# Patient Record
Sex: MALE | Race: WHITE | NOT HISPANIC OR LATINO | URBAN - METROPOLITAN AREA
[De-identification: names, ages, dates, MRNs, and addresses within clinical notes are randomized per-mention and may not be internally consistent; named-entity substitution may affect disease eponyms.]

---

## 2018-03-15 ENCOUNTER — INPATIENT (INPATIENT)
Facility: HOSPITAL | Age: 73
LOS: 0 days | Discharge: HOME | DRG: 866 | End: 2018-03-16
Attending: INTERNAL MEDICINE | Admitting: INTERNAL MEDICINE
Payer: MEDICARE

## 2018-03-15 VITALS
OXYGEN SATURATION: 97 % | SYSTOLIC BLOOD PRESSURE: 117 MMHG | HEART RATE: 84 BPM | RESPIRATION RATE: 18 BRPM | DIASTOLIC BLOOD PRESSURE: 76 MMHG | WEIGHT: 164.91 LBS | TEMPERATURE: 98 F

## 2018-03-15 DIAGNOSIS — Z29.9 ENCOUNTER FOR PROPHYLACTIC MEASURES, UNSPECIFIED: ICD-10-CM

## 2018-03-15 DIAGNOSIS — N17.9 ACUTE KIDNEY FAILURE, UNSPECIFIED: ICD-10-CM

## 2018-03-15 DIAGNOSIS — J11.1 INFLUENZA DUE TO UNIDENTIFIED INFLUENZA VIRUS WITH OTHER RESPIRATORY MANIFESTATIONS: ICD-10-CM

## 2018-03-15 DIAGNOSIS — I10 ESSENTIAL (PRIMARY) HYPERTENSION: ICD-10-CM

## 2018-03-15 DIAGNOSIS — I48.91 UNSPECIFIED ATRIAL FIBRILLATION: ICD-10-CM

## 2018-03-15 DIAGNOSIS — C61 MALIGNANT NEOPLASM OF PROSTATE: ICD-10-CM

## 2018-03-15 DIAGNOSIS — R63.8 OTHER SYMPTOMS AND SIGNS CONCERNING FOOD AND FLUID INTAKE: ICD-10-CM

## 2018-03-15 LAB
ALBUMIN SERPL ELPH-MCNC: 4.9 G/DL — SIGNIFICANT CHANGE UP (ref 3.3–5)
ALP SERPL-CCNC: 65 U/L — SIGNIFICANT CHANGE UP (ref 40–120)
ALT FLD-CCNC: 12 U/L — SIGNIFICANT CHANGE UP (ref 10–45)
ANION GAP SERPL CALC-SCNC: 18 MMOL/L — HIGH (ref 5–17)
APTT BLD: 29.6 SEC — SIGNIFICANT CHANGE UP (ref 27.5–37.4)
AST SERPL-CCNC: 19 U/L — SIGNIFICANT CHANGE UP (ref 10–40)
BASOPHILS NFR BLD AUTO: 0 % — SIGNIFICANT CHANGE UP (ref 0–2)
BILIRUB SERPL-MCNC: 0.4 MG/DL — SIGNIFICANT CHANGE UP (ref 0.2–1.2)
BUN SERPL-MCNC: 23 MG/DL — SIGNIFICANT CHANGE UP (ref 7–23)
CALCIUM SERPL-MCNC: 10.2 MG/DL — SIGNIFICANT CHANGE UP (ref 8.4–10.5)
CHLORIDE SERPL-SCNC: 95 MMOL/L — LOW (ref 96–108)
CK MB CFR SERPL CALC: 1.1 NG/ML — SIGNIFICANT CHANGE UP (ref 0–6.7)
CK SERPL-CCNC: 67 U/L — SIGNIFICANT CHANGE UP (ref 30–200)
CO2 SERPL-SCNC: 23 MMOL/L — SIGNIFICANT CHANGE UP (ref 22–31)
CREAT SERPL-MCNC: 1.61 MG/DL — HIGH (ref 0.5–1.3)
EOSINOPHIL NFR BLD AUTO: 0 % — SIGNIFICANT CHANGE UP (ref 0–6)
EXTRA SST TUBE: SIGNIFICANT CHANGE UP
GLUCOSE SERPL-MCNC: 121 MG/DL — HIGH (ref 70–99)
HCT VFR BLD CALC: 46.6 % — SIGNIFICANT CHANGE UP (ref 39–50)
HGB BLD-MCNC: 15.6 G/DL — SIGNIFICANT CHANGE UP (ref 13–17)
INR BLD: 1.11 — SIGNIFICANT CHANGE UP (ref 0.88–1.16)
LG PLATELETS BLD QL AUTO: PRESENT — SIGNIFICANT CHANGE UP
LYMPHOCYTES # BLD AUTO: 9 % — LOW (ref 13–44)
MANUAL DIF COMMENT BLD-IMP: SIGNIFICANT CHANGE UP
MANUAL SMEAR VERIFICATION: SIGNIFICANT CHANGE UP
MCHC RBC-ENTMCNC: 31.8 PG — SIGNIFICANT CHANGE UP (ref 27–34)
MCHC RBC-ENTMCNC: 33.5 G/DL — SIGNIFICANT CHANGE UP (ref 32–36)
MCV RBC AUTO: 94.9 FL — SIGNIFICANT CHANGE UP (ref 80–100)
MONOCYTES NFR BLD AUTO: 6 % — SIGNIFICANT CHANGE UP (ref 2–14)
NEUTROPHILS NFR BLD AUTO: 37 % — LOW (ref 43–77)
NEUTS BAND # BLD: 48 % — HIGH
PLAT MORPH BLD: (no result)
PLATELET # BLD AUTO: 153 K/UL — SIGNIFICANT CHANGE UP (ref 150–400)
POTASSIUM SERPL-MCNC: 4.5 MMOL/L — SIGNIFICANT CHANGE UP (ref 3.5–5.3)
POTASSIUM SERPL-SCNC: 4.5 MMOL/L — SIGNIFICANT CHANGE UP (ref 3.5–5.3)
PROT SERPL-MCNC: 8.9 G/DL — HIGH (ref 6–8.3)
PROTHROM AB SERPL-ACNC: 12.3 SEC — SIGNIFICANT CHANGE UP (ref 9.8–12.7)
RBC # BLD: 4.91 M/UL — SIGNIFICANT CHANGE UP (ref 4.2–5.8)
RBC # FLD: 12.5 % — SIGNIFICANT CHANGE UP (ref 10.3–16.9)
RBC BLD AUTO: NORMAL — SIGNIFICANT CHANGE UP
SODIUM SERPL-SCNC: 136 MMOL/L — SIGNIFICANT CHANGE UP (ref 135–145)
TROPONIN T SERPL-MCNC: <0.01 NG/ML — SIGNIFICANT CHANGE UP (ref 0–0.01)
WBC # BLD: 8.5 K/UL — SIGNIFICANT CHANGE UP (ref 3.8–10.5)
WBC # FLD AUTO: 8.5 K/UL — SIGNIFICANT CHANGE UP (ref 3.8–10.5)

## 2018-03-15 PROCEDURE — 99285 EMERGENCY DEPT VISIT HI MDM: CPT | Mod: 25

## 2018-03-15 PROCEDURE — 99223 1ST HOSP IP/OBS HIGH 75: CPT | Mod: AI

## 2018-03-15 PROCEDURE — 71045 X-RAY EXAM CHEST 1 VIEW: CPT | Mod: 26

## 2018-03-15 PROCEDURE — 93010 ELECTROCARDIOGRAM REPORT: CPT

## 2018-03-15 PROCEDURE — 93306 TTE W/DOPPLER COMPLETE: CPT | Mod: 26

## 2018-03-15 RX ORDER — FAMOTIDINE 10 MG/ML
20 INJECTION INTRAVENOUS DAILY
Qty: 0 | Refills: 0 | Status: DISCONTINUED | OUTPATIENT
Start: 2018-03-15 | End: 2018-03-16

## 2018-03-15 RX ORDER — APIXABAN 2.5 MG/1
5 TABLET, FILM COATED ORAL EVERY 12 HOURS
Qty: 0 | Refills: 0 | Status: DISCONTINUED | OUTPATIENT
Start: 2018-03-15 | End: 2018-03-16

## 2018-03-15 RX ORDER — LISINOPRIL 2.5 MG/1
0 TABLET ORAL
Qty: 0 | Refills: 0 | COMMUNITY

## 2018-03-15 RX ORDER — METOPROLOL TARTRATE 50 MG
25 TABLET ORAL ONCE
Qty: 0 | Refills: 0 | Status: COMPLETED | OUTPATIENT
Start: 2018-03-15 | End: 2018-03-15

## 2018-03-15 RX ORDER — ACETAMINOPHEN 500 MG
650 TABLET ORAL EVERY 6 HOURS
Qty: 0 | Refills: 0 | Status: DISCONTINUED | OUTPATIENT
Start: 2018-03-15 | End: 2018-03-16

## 2018-03-15 RX ORDER — IBUPROFEN 200 MG
600 TABLET ORAL EVERY 8 HOURS
Qty: 0 | Refills: 0 | Status: DISCONTINUED | OUTPATIENT
Start: 2018-03-15 | End: 2018-03-16

## 2018-03-15 RX ORDER — SODIUM CHLORIDE 9 MG/ML
1000 INJECTION INTRAMUSCULAR; INTRAVENOUS; SUBCUTANEOUS ONCE
Qty: 0 | Refills: 0 | Status: COMPLETED | OUTPATIENT
Start: 2018-03-15 | End: 2018-03-15

## 2018-03-15 RX ORDER — BENZOCAINE AND MENTHOL 5; 1 G/100ML; G/100ML
1 LIQUID ORAL EVERY 6 HOURS
Qty: 0 | Refills: 0 | Status: DISCONTINUED | OUTPATIENT
Start: 2018-03-15 | End: 2018-03-16

## 2018-03-15 RX ADMIN — BENZOCAINE AND MENTHOL 1 LOZENGE: 5; 1 LIQUID ORAL at 20:35

## 2018-03-15 RX ADMIN — Medication 75 MILLIGRAM(S): at 10:47

## 2018-03-15 RX ADMIN — Medication 25 MILLIGRAM(S): at 11:51

## 2018-03-15 RX ADMIN — APIXABAN 5 MILLIGRAM(S): 2.5 TABLET, FILM COATED ORAL at 11:50

## 2018-03-15 RX ADMIN — Medication 30 MILLIGRAM(S): at 21:50

## 2018-03-15 RX ADMIN — Medication 650 MILLIGRAM(S): at 14:45

## 2018-03-15 RX ADMIN — FAMOTIDINE 20 MILLIGRAM(S): 10 INJECTION INTRAVENOUS at 18:59

## 2018-03-15 RX ADMIN — Medication 600 MILLIGRAM(S): at 18:58

## 2018-03-15 RX ADMIN — SODIUM CHLORIDE 1000 MILLILITER(S): 9 INJECTION INTRAMUSCULAR; INTRAVENOUS; SUBCUTANEOUS at 10:43

## 2018-03-15 NOTE — H&P ADULT - ASSESSMENT
71 yo M with history of HTN, prostate Ca, paroxysmal atrial fibrillation (not on anticoagulation was sent to ED form urgent care for atrial fibrillation with RVR. Self resolved and admitted to [facility] for monitoring

## 2018-03-15 NOTE — ED PROVIDER NOTE - MEDICAL DECISION MAKING DETAILS
Impression: atrial fibrillation w/ rvr. HDS. Pt given cardizem 10mg ivp with improvement of hr. CXR neg for i/e/chf. Labs reviewed w/ findings of mild elev of cr to 1.6, likely 2/2 dehydration. Trop wnl. Case d/w Dr. Rodriguez; will admit to his svc for cardiac monitoring, ep evaluation. Will give eliquis and metoprolol po and arrange for echo.

## 2018-03-15 NOTE — H&P ADULT - PROBLEM SELECTOR PLAN 4
Pt with elevated Cr on admission   likely 2/2 dehydration given has not been eating much from feeling ill  - trend Cr tomorrow  -restart ACE-i when trended down to normal

## 2018-03-15 NOTE — CONSULT NOTE ADULT - SUBJECTIVE AND OBJECTIVE BOX
CHIEF COMPLAINT: aplpitations    HISTORY OF PRESENT ILLNESS: 71 yo M with history of HTN, prostate Ca, paroxysmal atrial fibrillation (not on anticoagulation  was sent to ED form urgent care for atrial fibrillation with RVR. Patient has been having fever, cough sore throat for 3 days. he was evaluated at urgent care diagnosed with type B flu and noted to be in rapid atrial fibrillation.   Patient was seen in the ED, while in the ED he self converted to sinus rhythm.     PAST MEDICAL & SURGICAL HISTORY:  HTN (hypertension)  Afib        PERTINENT DIAGNOSTIC TESTING:    [ ] Echocardiogram: < from: Echocardiogram (03.15.18 @ 13:17) >  Normal left ventricular size and wall thickness.There is mild global   hypokinesis of the left ventricle.The left ventricular ejection fraction   is   estimated to be 45-50%The left atrial size is normal.Right atrial size is   normal.The right ventricle is normal in size and function.Structurally   normal   mitral valve.No mitral regurgitation noted.Structurally normal tricuspid   valve.No tricuspid regurgitation noted.There is trace to mild aortic   regurgitation.Structurally normal aortic valve.There is no pericardial   effusion.      Allergies    No Known Allergies      	    MEDICATIONS:    oseltamivir 30 milliGRAM(s) Oral every 12 hour  acetaminophen   Tablet 650 milliGRAM(s) Oral every 6 hours NM  apixaban 5 milliGRAM(s) Oral every 12 hours      FAMILY HISTORY:      SOCIAL HISTORY:    [x ] Non-smoker        REVIEW OF SYSTEMS:    CONSTITUTIONAL: + fever, weight loss, + fatigue  EYES: No eye pain, visual disturbances, or discharge  ENMT:  No difficulty hearing, tinnitus, vertigo; No sinus or + throat pain  NECK: No pain or stiffness  RESPIRATORY: + cough, wheezing, chills or hemoptysis; No Shortness of Breath  CARDIOVASCULAR: No chest pain, + palpitations, dizziness, or leg swelling  GASTROINTESTINAL: No abdominal or epigastric pain. No nausea, vomiting, or hematemesis; No diarrhea or constipation.   GENITOURINARY: No dysuria, frequency, hematuria, or incontinence  NEUROLOGICAL: No headaches, memory loss, loss of strength, numbness, or tremors  SKIN: No itching, burning, rashes, or lesions   LYMPH Nodes: No enlarged glands  ENDOCRINE: No heat or cold intolerance; No hair loss  MUSCULOSKELETAL: No joint pain or swelling; No muscle, back, or extremity pain  PSYCHIATRIC: No depression, anxiety, mood swings, or difficulty sleeping    PHYSICAL EXAM:  T(C): 38.8 (03-15-18 @ 14:35), Max: 38.8 (03-15-18 @ 14:35)  HR: 146 (03-15-18 @ 12:24) (84 - 146)  BP: 128/76 (03-15-18 @ 12:24) (117/76 - 128/76)  RR: 15 (03-15-18 @ 12:24) (15 - 18)  SpO2: 95% (03-15-18 @ 12:24) (95% - 98%)  Wt(kg): --  I&O's Summary      TELEMETRY:  sinus rhythm    ECG:   	  HEENT:  PERRL, EOMI	  Cardiovascular:  S1 S2, no edema  Respiratory: Lungs clear to auscultation	  Gastrointestinal:  Soft, Non-tender, + BS	  Neurologic: A&O x 3,  Extremities: no edema    	LABS:	 	    CARDIAC MARKERS:                            15.6   8.5   )-----------( 153      ( 15 Mar 2018 10:38 )             46.6     03-15    136  |  95<L>  |  23  ----------------------------<  121<H>  4.5   |  23  |  1.61<H>    Ca    10.2      15 Mar 2018 10:38    TPro  8.9<H>  /  Alb  4.9  /  TBili  0.4  /  DBili  x   /  AST  19  /  ALT  12  /  AlkPhos  65  03-15    proBNP:   Lipid Profile:   HgA1c:   TSH:     ASSESSMENT/PLAN: 	  71 yo M with history of HTN, prostate Ca, paroxysmal atrial fibrillation (not on anticoagulation  was sent to ED form urgent care for atrial fibrillation with RVR. Patient self converted to sinus rhythm, agree with anticoagulation with Eliquis.  Will monitor telemetry for any episodes of atrial fibrillation.

## 2018-03-15 NOTE — ED PROVIDER NOTE - PHYSICAL EXAMINATION
VITAL SIGNS: I have reviewed nursing notes and confirm.  CONSTITUTIONAL: Well-developed; well-nourished; in no acute distress.   SKIN:  warm and dry, no acute rash.   HEAD:  normocephalic, atraumatic.  EYES: PERRL, EOM intact; conjunctiva and sclera clear.  ENT: No nasal discharge; airway clear.   NECK: Supple; non tender.  CARD: irreg irreg rhythm, no m/r/g.  RESP:  Clear to auscultation b/l, no wheezes, rales or rhonchi.  ABD: Normal bowel sounds; soft; non-distended; non-tender; no guarding/ rebound.  EXT: Normal ROM. No clubbing, cyanosis or edema. 2+ pulses to b/l ue/le.  NEURO: Alert, oriented, grossly unremarkable  PSYCH: Cooperative, mood and affect appropriate.

## 2018-03-15 NOTE — H&P ADULT - NSHPPHYSICALEXAM_GEN_ALL_CORE
.  VITAL SIGNS:  T(F): 101.8 (03-15-18 @ 14:35), Max: 101.8 (03-15-18 @ 14:35)  HR: 146 (03-15-18 @ 12:24) (84 - 146)  BP: 128/76 (03-15-18 @ 12:24) (117/76 - 128/76)  BP(mean): 84 (03-15-18 @ 12:24) (84 - 84)  RR: 15 (03-15-18 @ 12:24) (15 - 18)  SpO2: 95% (03-15-18 @ 12:24) (95% - 98%)    PHYSICAL EXAM:    Constitutional: WDWN resting comfortably in bed; NAD  HEENT: NC/AT, PERRL, EOMI, anicteric sclera, no nasal discharge; uvula midline, no oropharyngeal erythema or exudates; MMM  Neck: supple; no JVD or thyromegaly  Respiratory: CTA B/L; no W/R/R, no retractions  Cardiac: +S1/S2; RRR; no M/R/G; PMI non-displaced  Gastrointestinal: soft, NT/ND; no rebound or guarding; +BSx4  Back: spine midline, no bony tenderness or step-offs; no CVAT B/L  Extremities: WWP, no clubbing or cyanosis; no peripheral edema  Musculoskeletal: NROM x4; no joint swelling, tenderness or erythema  Vascular: 2+ radial, femoral, DP/PT pulses B/L  Dermatologic: skin warm, dry and intact; no rashes, wounds, or scars  Lymphatic: no submandibular or cervical LAD  Neurologic: AAOx3; CNII-XII grossly intact; no focal deficits  Psychiatric: affect and characteristics of appearance, verbalizations, behaviors are appropriate, denies SI/HI/AH/VH .  VITAL SIGNS:  T(F): 101.8 (03-15-18 @ 14:35), Max: 101.8 (03-15-18 @ 14:35)  HR: 146 (03-15-18 @ 12:24) (84 - 146)  BP: 128/76 (03-15-18 @ 12:24) (117/76 - 128/76)  BP(mean): 84 (03-15-18 @ 12:24) (84 - 84)  RR: 15 (03-15-18 @ 12:24) (15 - 18)  SpO2: 95% (03-15-18 @ 12:24) (95% - 98%)    PHYSICAL EXAM:    Constitutional: WDWN resting comfortably in bed; NAD  HEENT: NC/AT, PERRL, EOMI, anicteric sclera, no nasal discharge; uvula midline, no oropharyngeal erythema or exudates; MMM  Neck: supple; no JVD or thyromegaly  Respiratory: CTA B/L; no W/R/R, no retractions  Cardiac: +S1/S2; RRR; no M/R/G; PMI non-displaced  Gastrointestinal: soft, NT/ND; no rebound or guarding; +BSx4  Extremities: WWP, no clubbing or cyanosis; no peripheral edema  Musculoskeletal: NROM x4; no joint swelling, tenderness or erythema  Vascular: 2+ radial, femoral, DP/PT pulses B/L  Dermatologic: skin warm, dry and intact; no rashes, wounds, or scars  Lymphatic: no submandibular or cervical LAD  Neurologic: AAOx3; CNII-XII grossly intact; no focal deficits  Psychiatric: affect and characteristics of appearance, verbalizations, behaviors are appropriate, denies SI/HI/AH/VH

## 2018-03-15 NOTE — PATIENT PROFILE ADULT. - TEACHING/LEARNING LEARNING PREFERENCES
written material/group instruction/video/pictorial/individual instruction/skill demonstration/computer/internet/audio/verbal instruction

## 2018-03-15 NOTE — ED ADULT TRIAGE NOTE - CHIEF COMPLAINT QUOTE
Sent in by Dr. Jung (Cardiologist) for abnormal ekg done today.  Denies palpitations, chest pain, sob, dizziness. Hx afib (not on thinners, only taking lisinopril), HTN.  Also c/o cough x 1 week and a fever of 100.3F yesterday (took advil at 11pm last night) and tested +Flu today.  Did not take tamiflu yet.

## 2018-03-15 NOTE — H&P ADULT - NSHPREVIEWOFSYSTEMS_GEN_ALL_CORE
Denies fever, chills, headaches, lightheadedness, changes in vision, congestion, sore throat, chest pain, palpitations, shortness of breath, abdominal pain/distension, n/v/d/c, dyuria, LE edema, numbness or tingling in his fingers or toes  Positive: myalgias, cough, heartburn

## 2018-03-15 NOTE — ED ADULT NURSE NOTE - OBJECTIVE STATEMENT
71 y/o male sent in from urgent care for rapid Afib. pt c/o cough and fever for 3 days, highest temp at home 103. given mask in triage. afebrile on arrival. known history of Afib and HTN, not on any thinners, pt took 400mg Motrin at 630am. EKG done, pt placed on continuous cardiac monitor, upgraded to MD Novoa. pt medicated with 10mg IV Cardizem per MD Novoa order. labs sent. pt denies chest pain, sob.

## 2018-03-15 NOTE — H&P ADULT - NSHPLABSRESULTS_GEN_ALL_CORE
.  LABS:                         15.6   8.5   )-----------( 153      ( 15 Mar 2018 10:38 )             46.6     03-15    136  |  95<L>  |  23  ----------------------------<  121<H>  4.5   |  23  |  1.61<H>    Ca    10.2      15 Mar 2018 10:38    TPro  8.9<H>  /  Alb  4.9  /  TBili  0.4  /  DBili  x   /  AST  19  /  ALT  12  /  AlkPhos  65  03-15    PT/INR - ( 15 Mar 2018 10:38 )   PT: 12.3 sec;   INR: 1.11          PTT - ( 15 Mar 2018 10:38 )  PTT:29.6 sec    CARDIAC MARKERS ( 15 Mar 2018 10:38 )  x     / <0.01 ng/mL / 67 U/L / x     / 1.1 ng/mL            RADIOLOGY, EKG & ADDITIONAL TESTS: Reviewed.

## 2018-03-15 NOTE — H&P ADULT - HISTORY OF PRESENT ILLNESS
71 yo M with history of HTN, prostate Ca, paroxysmal atrial fibrillation (not on anticoagulation was sent to ED form urgent care for atrial fibrillation with RVR. The patient states that he has been experiencing fever, myalgias, cough, and sore throat for 3 days. He went to an urgent care and was diagnosed with Influenza B. He was noted to be in atrial fibrillation with RVR at the time as well and was sent to the ED for further management. Of note, the patient several years ago went to his primary care doctor and was incidentally found to be in atrial fibrillation. He was placed on a Holter monitor he time which showed paroxsymal atrial fibrillation. No medications were given at that time and the patient was not sure why. He notes over the years he has feelings of acid reflux/heartburn intermittently and it feels the same as when he is in atrial fibrillation with RVR currently.     In the ED, the pt was initially in afib with RVR. Pt was given Cardizem 10mg IV once and HR improved. Trop WNL. ECHO showed There is mild global hypokinesis of the left ventricle. The left ventricular ejection fraction is estimated to be 45-50%. No structural abnormalities noted. Pt converted to sinus rhythm once on the floor.

## 2018-03-15 NOTE — H&P ADULT - PROBLEM SELECTOR PLAN 1
Pt presented with atrial fibrillation with rvr likely 2/2 flu   Pt has hx of afib with rvr in the past and has not been on any ac  Broke with Cardizem 10mg IV once   - c/w eliquis 2.5mg BID (renally dosed)   - cardizem prn if pt converts into afib  -monitor on tele Pt presented with atrial fibrillation with rvr likely 2/2 flu   Pt has hx of afib with rvr in the past and has not been on any ac  Broke with Cardizem 10mg IV once   - c/w eliquis 2.5mg BID (renally dosed)   - cardizem prn if pt converts into afib  -monitor on tele  - EP consulted, ntd at this time

## 2018-03-15 NOTE — H&P ADULT - NSHPOUTPATIENTPROVIDERS_GEN_ALL_CORE
Dr. Walsh [phone] PMD  Dr. Olivo [phone] Cardiologist  Pharm[facility] [phone] [address]

## 2018-03-15 NOTE — ED PROVIDER NOTE - OBJECTIVE STATEMENT
Pt is a 71yo m, h/o htn, paf, not on ac, prostate ca s/p prostatectomy, referred to ed for afib w/ rvr. Pt began having fever 3d ago, a/w myalgias, sorethroat, cough, decreased po intake. Went to [facility] today and was dx'd w/ flu B and found to be in rapid afib. + chronic cp daily, described as tightness to lower chest, intermittent, no allev/ aggrav factors, occurs on daily basis, unchanged. + occasional palpitations, none currently. No sob, abd pain, vomiting, diarrhea, urinary sx's.

## 2018-03-15 NOTE — ED PROVIDER NOTE - CARE PLAN
Principal Discharge DX:	Atrial fibrillation with rapid ventricular response  Secondary Diagnosis:	Influenza

## 2018-03-16 ENCOUNTER — TRANSCRIPTION ENCOUNTER (OUTPATIENT)
Age: 73
End: 2018-03-16

## 2018-03-16 VITALS — TEMPERATURE: 100 F

## 2018-03-16 LAB
ANION GAP SERPL CALC-SCNC: 12 MMOL/L — SIGNIFICANT CHANGE UP (ref 5–17)
BUN SERPL-MCNC: 19 MG/DL — SIGNIFICANT CHANGE UP (ref 7–23)
CALCIUM SERPL-MCNC: 9.1 MG/DL — SIGNIFICANT CHANGE UP (ref 8.4–10.5)
CHLORIDE SERPL-SCNC: 100 MMOL/L — SIGNIFICANT CHANGE UP (ref 96–108)
CO2 SERPL-SCNC: 24 MMOL/L — SIGNIFICANT CHANGE UP (ref 22–31)
CREAT SERPL-MCNC: 0.96 MG/DL — SIGNIFICANT CHANGE UP (ref 0.5–1.3)
GLUCOSE SERPL-MCNC: 119 MG/DL — HIGH (ref 70–99)
HCT VFR BLD CALC: 40.9 % — SIGNIFICANT CHANGE UP (ref 39–50)
HGB BLD-MCNC: 13.4 G/DL — SIGNIFICANT CHANGE UP (ref 13–17)
MAGNESIUM SERPL-MCNC: 1.8 MG/DL — SIGNIFICANT CHANGE UP (ref 1.6–2.6)
MCHC RBC-ENTMCNC: 30.6 PG — SIGNIFICANT CHANGE UP (ref 27–34)
MCHC RBC-ENTMCNC: 32.8 G/DL — SIGNIFICANT CHANGE UP (ref 32–36)
MCV RBC AUTO: 93.4 FL — SIGNIFICANT CHANGE UP (ref 80–100)
PLATELET # BLD AUTO: 138 K/UL — LOW (ref 150–400)
POTASSIUM SERPL-MCNC: 4.3 MMOL/L — SIGNIFICANT CHANGE UP (ref 3.5–5.3)
POTASSIUM SERPL-SCNC: 4.3 MMOL/L — SIGNIFICANT CHANGE UP (ref 3.5–5.3)
RBC # BLD: 4.38 M/UL — SIGNIFICANT CHANGE UP (ref 4.2–5.8)
RBC # FLD: 12.3 % — SIGNIFICANT CHANGE UP (ref 10.3–16.9)
SODIUM SERPL-SCNC: 136 MMOL/L — SIGNIFICANT CHANGE UP (ref 135–145)
WBC # BLD: 9 K/UL — SIGNIFICANT CHANGE UP (ref 3.8–10.5)
WBC # FLD AUTO: 9 K/UL — SIGNIFICANT CHANGE UP (ref 3.8–10.5)

## 2018-03-16 PROCEDURE — 84484 ASSAY OF TROPONIN QUANT: CPT

## 2018-03-16 PROCEDURE — 71046 X-RAY EXAM CHEST 2 VIEWS: CPT

## 2018-03-16 PROCEDURE — 85610 PROTHROMBIN TIME: CPT

## 2018-03-16 PROCEDURE — 82550 ASSAY OF CK (CPK): CPT

## 2018-03-16 PROCEDURE — 85730 THROMBOPLASTIN TIME PARTIAL: CPT

## 2018-03-16 PROCEDURE — 36415 COLL VENOUS BLD VENIPUNCTURE: CPT

## 2018-03-16 PROCEDURE — 83735 ASSAY OF MAGNESIUM: CPT

## 2018-03-16 PROCEDURE — 71046 X-RAY EXAM CHEST 2 VIEWS: CPT | Mod: 26

## 2018-03-16 PROCEDURE — 82553 CREATINE MB FRACTION: CPT

## 2018-03-16 PROCEDURE — 71045 X-RAY EXAM CHEST 1 VIEW: CPT

## 2018-03-16 PROCEDURE — 85025 COMPLETE CBC W/AUTO DIFF WBC: CPT

## 2018-03-16 PROCEDURE — 80053 COMPREHEN METABOLIC PANEL: CPT

## 2018-03-16 PROCEDURE — 93306 TTE W/DOPPLER COMPLETE: CPT

## 2018-03-16 PROCEDURE — 93005 ELECTROCARDIOGRAM TRACING: CPT

## 2018-03-16 PROCEDURE — 99238 HOSP IP/OBS DSCHRG MGMT 30/<: CPT

## 2018-03-16 PROCEDURE — 99223 1ST HOSP IP/OBS HIGH 75: CPT

## 2018-03-16 PROCEDURE — 80048 BASIC METABOLIC PNL TOTAL CA: CPT

## 2018-03-16 PROCEDURE — 85027 COMPLETE CBC AUTOMATED: CPT

## 2018-03-16 PROCEDURE — 96374 THER/PROPH/DIAG INJ IV PUSH: CPT

## 2018-03-16 PROCEDURE — 99285 EMERGENCY DEPT VISIT HI MDM: CPT | Mod: 25

## 2018-03-16 RX ORDER — IBUPROFEN 200 MG
600 TABLET ORAL EVERY 8 HOURS
Qty: 0 | Refills: 0 | Status: DISCONTINUED | OUTPATIENT
Start: 2018-03-16 | End: 2018-03-16

## 2018-03-16 RX ORDER — METOPROLOL TARTRATE 50 MG
5 TABLET ORAL ONCE
Qty: 0 | Refills: 0 | Status: COMPLETED | OUTPATIENT
Start: 2018-03-16 | End: 2018-03-16

## 2018-03-16 RX ORDER — SODIUM CHLORIDE 9 MG/ML
500 INJECTION INTRAMUSCULAR; INTRAVENOUS; SUBCUTANEOUS ONCE
Qty: 0 | Refills: 0 | Status: COMPLETED | OUTPATIENT
Start: 2018-03-16 | End: 2018-03-16

## 2018-03-16 RX ORDER — LISINOPRIL 2.5 MG/1
40 TABLET ORAL
Qty: 0 | Refills: 0 | COMMUNITY

## 2018-03-16 RX ORDER — METOPROLOL TARTRATE 50 MG
12.5 TABLET ORAL
Qty: 30 | Refills: 0
Start: 2018-03-16 | End: 2018-04-14

## 2018-03-16 RX ORDER — APIXABAN 2.5 MG/1
1 TABLET, FILM COATED ORAL
Qty: 60 | Refills: 0
Start: 2018-03-16 | End: 2018-04-14

## 2018-03-16 RX ADMIN — Medication 600 MILLIGRAM(S): at 05:10

## 2018-03-16 RX ADMIN — BENZOCAINE AND MENTHOL 1 LOZENGE: 5; 1 LIQUID ORAL at 17:08

## 2018-03-16 RX ADMIN — BENZOCAINE AND MENTHOL 1 LOZENGE: 5; 1 LIQUID ORAL at 05:51

## 2018-03-16 RX ADMIN — Medication 5 MILLIGRAM(S): at 17:08

## 2018-03-16 RX ADMIN — BENZOCAINE AND MENTHOL 1 LOZENGE: 5; 1 LIQUID ORAL at 12:39

## 2018-03-16 RX ADMIN — FAMOTIDINE 20 MILLIGRAM(S): 10 INJECTION INTRAVENOUS at 09:53

## 2018-03-16 RX ADMIN — APIXABAN 5 MILLIGRAM(S): 2.5 TABLET, FILM COATED ORAL at 05:51

## 2018-03-16 RX ADMIN — Medication 30 MILLIGRAM(S): at 09:53

## 2018-03-16 RX ADMIN — APIXABAN 5 MILLIGRAM(S): 2.5 TABLET, FILM COATED ORAL at 17:08

## 2018-03-16 RX ADMIN — SODIUM CHLORIDE 2000 MILLILITER(S): 9 INJECTION INTRAMUSCULAR; INTRAVENOUS; SUBCUTANEOUS at 09:53

## 2018-03-16 RX ADMIN — Medication 650 MILLIGRAM(S): at 18:35

## 2018-03-16 RX ADMIN — Medication 600 MILLIGRAM(S): at 05:53

## 2018-03-16 NOTE — PROGRESS NOTE ADULT - PROBLEM SELECTOR PLAN 1
Pt presented with atrial fibrillation with rvr likely 2/2 flu   Pt has hx of afib with rvr in the past and has not been on any ac  Broke with Cardizem 10mg IV once   - c/w eliquis 3mg BID  - cardizem prn if pt converts into afib  -monitor on tele  - EP consulted awaiting recs considering pt had two epsiodes of afib with RVR

## 2018-03-16 NOTE — DISCHARGE NOTE ADULT - CARE PROVIDER_API CALL
Miguel Angel Olivo (MD), Cardiovascular Disease; Internal Medicine  [address] [place] [address]  Phone: [phone]  Fax: [phone]    Gurwinder blue  [facility]: [address]  [address]  Phone: [phone]  Fax: (   )    - Gurwinder blue  [facility]: [address]  [address]  Phone: [phone]  Fax: (   )    -    Adam Sky  [address] [place] [address]  Phone: [phone]  Fax: (   )    -

## 2018-03-16 NOTE — PROGRESS NOTE ADULT - SUBJECTIVE AND OBJECTIVE BOX
EPS Progress Note    S: in bed, NAD, no c/o palpitations, or chest pain     MEDICATIONS  (STANDING):  apixaban 5 milliGRAM(s) Oral every 12 hours  benzocaine 15 mG/menthol 3.6 mG Lozenge 1 Lozenge Oral every 6 hours  oseltamivir 75 milliGRAM(s) Oral two times a day      Telemetry: sinus rhythm with episodes of  paroxysmal atrial fibrillation           General:  NAD         Chest:  CTA B/L     Cardiac:    s1/s2         Irregular     Abdomen:  +BS      Soft      Non-Tender      Non-Distended       Extremities:  no edema      Labs:                                                               13.4   9.0   )-----------( 138      ( 16 Mar 2018 06:17 )             40.9     03-16    136  |  100  |  19  ----------------------------<  119<H>  4.3   |  24  |  0.96    Ca    9.1      16 Mar 2018 06:17  Mg     1.8     03-16    TPro  8.9<H>  /  Alb  4.9  /  TBili  0.4  /  DBili  x   /  AST  19  /  ALT  12  /  AlkPhos  65  03-15    PT/INR - ( 15 Mar 2018 10:38 )   PT: 12.3 sec;   INR: 1.11          PTT - ( 15 Mar 2018 10:38 )  PTT:29.6 sec    Assessment/Plan:  73 yo M with history of HTN, prostate Ca, paroxysmal atrial fibrillation (not on anticoagulation  was sent to ED form urgent care for atrial fibrillation with RVR. Patient self converted to sinus rhythm, telemetry monitoring showed episodes of paroxysmal atrial fibrillation with self  conversion to sinus rhythm.  Episodes of atrial fibrillation correlates with fever, continue anticoagulation with Eliquis. Would start beta blockers for rate control, taper dose up as BP allows. No antiarrythmic drugs at this point.

## 2018-03-16 NOTE — DISCHARGE NOTE ADULT - HOSPITAL COURSE
71 yo M with history of HTN, prostate Ca, paroxysmal atrial fibrillation (not on anticoagulation was sent to ED form urgent care for atrial fibrillation with RVR. The patient states that he has been experiencing fever, myalgias, cough, and sore throat for 3 days. He went to an urgent care and was diagnosed with Influenza B. He was noted to be in atrial fibrillation with RVR at the time as well and was sent to the ED for further management. Of note, the patient several years ago went to his primary care doctor and was incidentally found to be in atrial fibrillation. He was placed on a Holter monitor he time which showed paroxsymal atrial fibrillation. No medications were given at that time and the patient was not sure why. He notes over the years he has feelings of acid reflux/heartburn intermittently and it feels the same as when he is in atrial fibrillation with RVR currently. In the ED, the pt was initially in afib with RVR. Pt was given Cardizem 10mg IV once and HR improved. Trop WNL. ECHO showed There is mild global hypokinesis of the left ventricle. The left ventricular ejection fraction is estimated to be 45-50%. No structural abnormalities noted. Pt converted to sinus rhythm once on the floor and started on eliquis. Pt remained HDS and is safe for DC. Patient was medically optimized, stable, and ready for discharge. Plan of care and return precautions were discussed with the patient who verbally stated understanding.    Attempted to make follow up appointment with PMD and cardiologist however, his cardiologist was out of town and not taking appointment until next week. Advised pt to make follow up appointments. 73 yo M with history of HTN, prostate Ca, paroxysmal atrial fibrillation (not on anticoagulation was sent to ED form urgent care for atrial fibrillation with RVR. The patient states that he has been experiencing fever, myalgias, cough, and sore throat for 3 days. He went to an urgent care and was diagnosed with Influenza B. He was noted to be in atrial fibrillation with RVR at the time as well and was sent to the ED for further management. Of note, the patient several years ago went to his primary care doctor and was incidentally found to be in atrial fibrillation. He was placed on a Holter monitor he time which showed paroxsymal atrial fibrillation. No medications were given at that time and the patient was not sure why. He notes over the years he has feelings of acid reflux/heartburn intermittently and it feels the same as when he is in atrial fibrillation with RVR currently. In the ED, the pt was initially in afib with RVR. Pt was given Cardizem 10mg IV once and HR improved. Trop WNL. ECHO showed There is mild global hypokinesis of the left ventricle. The left ventricular ejection fraction is estimated to be 45-50%. No structural abnormalities noted. Pt converted to sinus rhythm once on the floor and started on eliquis. Due to a few episodes of afib with rvr, pt to be dc on lopressor 12.5 BID. Pt remained HDS and is safe for DC. Patient was medically optimized, stable, and ready for discharge. Plan of care and return precautions were discussed with the patient who verbally stated understanding.    Attempted to make follow up appointment with PMD and cardiologist however, his cardiologist was out of town and not taking appointment until next week. Advised pt to make follow up appointments.

## 2018-03-16 NOTE — DISCHARGE NOTE ADULT - MEDICATION SUMMARY - MEDICATIONS TO TAKE
I will START or STAY ON the medications listed below when I get home from the hospital:    lisinopril  -- 40 milligram(s) by mouth once a day  -- Indication: For HTN (hypertension)    apixaban 5 mg oral tablet  -- 1 tab(s) by mouth every 12 hours  -- Indication: For Afib    oseltamivir 30 mg oral capsule  -- 1 cap(s) by mouth every 12 hours  -- Indication: For Influenza I will START or STAY ON the medications listed below when I get home from the hospital:    lisinopril  -- 40 milligram(s) by mouth once a day  -- Indication: For HTN (hypertension)    apixaban 5 mg oral tablet  -- 1 tab(s) by mouth every 12 hours  -- Indication: For Afib    oseltamivir 75 mg oral capsule  -- 1 cap(s) by mouth 2 times a day x 3 days   -- Check with your doctor before becoming pregnant.  Finish all this medication unless otherwise directed by prescriber.    -- Indication: For Influenza I will START or STAY ON the medications listed below when I get home from the hospital:    apixaban 5 mg oral tablet  -- 1 tab(s) by mouth every 12 hours  -- Indication: For Afib    oseltamivir 75 mg oral capsule  -- 1 cap(s) by mouth 2 times a day x 3 days   -- Check with your doctor before becoming pregnant.  Finish all this medication unless otherwise directed by prescriber.    -- Indication: For Influenza    metoprolol tartrate 25 mg oral tablet  -- 12.5 milligram(s) by mouth 2 times a day   -- It is very important that you take or use this exactly as directed.  Do not skip doses or discontinue unless directed by your doctor.  May cause drowsiness.  Alcohol may intensify this effect.  Use care when operating dangerous machinery.  Some non-prescription drugs may aggravate your condition.  Read all labels carefully.  If a warning appears, check with your doctor before taking.  Take with food or milk.  This drug may impair the ability to drive or operate machinery.  Use care until you become familiar with its effects.    -- Indication: For Afib

## 2018-03-16 NOTE — DISCHARGE NOTE ADULT - PROVIDER TOKENS
TOKEN:'[id number]',FREE:[LAST:[su],FIRST:[Gurwinder],PHONE:[phone]],FAX:[(   )    -],ADDRESS:[[facility]: [address]  [address] [place] [address]]] FREE:[LAST:[nico],FIRST:[Gurwinder],PHONE:[phone]],FAX:[(   )    -],ADDRESS:[[facility]: [address]  [address]],FREE:[LAST:[Jose Daniel],FIRST:[Adam],PHONE:[phone]],FAX:[(   )    -],ADDRESS:[[address] [place] [address]]]

## 2018-03-16 NOTE — DISCHARGE NOTE ADULT - CARE PLAN
Principal Discharge DX:	Atrial fibrillation with rapid ventricular response  Goal:	Discharge to home. To remain in normal rhythm.  Assessment and plan of treatment:	You presented to the hospital in atrial fibrillation. This was likely exacerbated by the influenza infection. You converted to a normal rhythm after receiving Cardizem one time. An echocardiogram was performed of your heart which showed preserved function. You were started on Eliquis, a blood thinner, to avoid formation of clots. Please continue to take Eliquis as prescribed and make a follow up appointment in 1-2 weeks for further evaluation of your heart rhythm.  Secondary Diagnosis:	Influenza  Goal:	To remain symptom free.  Assessment and plan of treatment:	You presented with a cough and muscle aches. You were found to be flu positive at the urgent care center and started on Tamiflu. Please continue to take Tamiflu as prescribed for a total of 5 days of therapy to end on 3/19. Please follow up with your primary care doctor in 1 week for a post hospitalization visit. with  Secondary Diagnosis:	HTN (hypertension)  Goal:	Stable.  Assessment and plan of treatment:	Please continue to take your lisinopril as prescribed. Principal Discharge DX:	Atrial fibrillation with rapid ventricular response  Goal:	Discharge to home. To remain in normal rhythm.  Assessment and plan of treatment:	You presented to the hospital in atrial fibrillation. This was likely exacerbated by the influenza infection. You converted to a normal rhythm after receiving Cardizem one time. An echocardiogram was performed of your heart which showed preserved function. You were started on Eliquis, a blood thinner, to avoid formation of clots. Please continue to take Eliquis as prescribed and make a follow up appointment in 1-2 weeks for further evaluation of your heart rhythm. Due to having episodes of atrial fibrillation, please start lopressor 12.5 mg twice a day while you have the flu. Please follow up with your cardiologist in 1-2 weeks for a check on your heart rhythm and blood pressure check.  Secondary Diagnosis:	Influenza  Goal:	To remain symptom free.  Assessment and plan of treatment:	You presented with a cough and muscle aches. You were found to be flu positive at the urgent care center and started on Tamiflu. Please continue to take Tamiflu as prescribed for a total of 5 days of therapy to end on 3/19. Please follow up with your primary care doctor in 1 week for a post hospitalization visit. with  Secondary Diagnosis:	HTN (hypertension)  Goal:	Stable.  Assessment and plan of treatment:	Please take the lopressor as prescribed above. Due to your blood pressure being on the lower side during your hospitalization, please stop taking the lisinopril until you see your cardiologist for a blood pressure check. Principal Discharge DX:	Atrial fibrillation with rapid ventricular response  Goal:	Discharge to home. To remain in normal rhythm.  Assessment and plan of treatment:	You presented to the hospital in atrial fibrillation. This was likely exacerbated by the influenza infection. You converted to a normal rhythm after receiving Cardizem one time. An echocardiogram was performed of your heart which showed preserved function of 50-55%. You were started on Eliquis, a blood thinner, to avoid formation of clots. Please continue to take Eliquis as prescribed and make a follow up appointment in 1-2 weeks for further evaluation of your heart rhythm. Due to having episodes of atrial fibrillation, please start lopressor 12.5 mg twice a day while you have the flu. Please follow up with your cardiologist in 1-2 weeks for a check on your heart rhythm and blood pressure check.  Secondary Diagnosis:	Influenza  Goal:	To remain symptom free.  Assessment and plan of treatment:	You presented with a cough and muscle aches. You were found to be flu positive at the urgent care center and started on Tamiflu. Please continue to take Tamiflu as prescribed for a total of 5 days of therapy to end on 3/19. Please follow up with your primary care doctor in 1 week for a post hospitalization visit. with  Secondary Diagnosis:	HTN (hypertension)  Goal:	Stable.  Assessment and plan of treatment:	Please take the lopressor as prescribed above. Due to your blood pressure being on the lower side during your hospitalization, please stop taking the lisinopril until you see your cardiologist for a blood pressure check.

## 2018-03-16 NOTE — PROGRESS NOTE ADULT - SUBJECTIVE AND OBJECTIVE BOX
INTERVAL HPI/OVERNIGHT EVENTS:  ON: had brief episode of afib with RVR on tele overnight   Patient was seen and examined at bedside. As per nurse and patient, no o/n events, patient resting comfortably. He has a slight cough and myalgias but feels better. Patient denies: fever, chills, dizziness, weakness, HA, Changes in vision, CP, palpitations, SOB, N/V/D/C, dysuria, changes in bowel movements, LE edema.    VITAL SIGNS:  T(F): 99.7 (03-16-18 @ 13:15)  HR: 118 (03-16-18 @ 11:23)  BP: 117/73 (03-16-18 @ 11:23)  RR: 16 (03-16-18 @ 11:23)  SpO2: 98% (03-16-18 @ 11:23)  Wt(kg): --    PHYSICAL EXAM:    Constitutional: WDWN, NAD  Eyes: PERRL, EOMI, sclera non-icteric  Neck: supple, trachea midline, no masses, no JVD  Respiratory: Minimal bibasilar crackles , without accessory muscle use and no intercostal retractions  Cardiovascular: RRR, normal S1S2, no M/R/G  Gastrointestinal: soft, NTND, no masses palpable, BS normal  Extremities: Warm, well perfused, pulses equal bilateral upper and lower extremities, no edema, no clubbing  Neurological: AAOx3, CN Grossly intact  Skin: Normal temperature, warm, dry    MEDICATIONS  (STANDING):  apixaban 5 milliGRAM(s) Oral every 12 hours  benzocaine 15 mG/menthol 3.6 mG Lozenge 1 Lozenge Oral every 6 hours  oseltamivir 75 milliGRAM(s) Oral two times a day    MEDICATIONS  (PRN):  acetaminophen   Tablet 650 milliGRAM(s) Oral every 6 hours PRN For Temp greater than 38 C (100.4 F)      Allergies    No Known Allergies    Intolerances        LABS:                        13.4   9.0   )-----------( 138      ( 16 Mar 2018 06:17 )             40.9     03-16    136  |  100  |  19  ----------------------------<  119<H>  4.3   |  24  |  0.96    Ca    9.1      16 Mar 2018 06:17  Mg     1.8     03-16    TPro  8.9<H>  /  Alb  4.9  /  TBili  0.4  /  DBili  x   /  AST  19  /  ALT  12  /  AlkPhos  65  03-15    PT/INR - ( 15 Mar 2018 10:38 )   PT: 12.3 sec;   INR: 1.11          PTT - ( 15 Mar 2018 10:38 )  PTT:29.6 sec      RADIOLOGY & ADDITIONAL TESTS:

## 2018-03-16 NOTE — DISCHARGE NOTE ADULT - ADDITIONAL INSTRUCTIONS
Please make a follow up appointment with Dr. Olivo, your cardiologist, in 1-2 weeks to evaluate your heart rhythm.   Please make a follow up appointment with Dr. Walsh, your primary care doctor within 1 week for a post hospitalization visit. Please make a follow up appointment with Dr. Sky, your cardiologist, in 1-2 weeks to evaluate your heart rhythm. and a blood pressure check.   Please make a follow up appointment with Dr. Walsh, your primary care doctor within 1 week for a post hospitalization visit.

## 2018-03-16 NOTE — DISCHARGE NOTE ADULT - PATIENT PORTAL LINK FT
You can access the [facility], offered by [facility], by registering with the following website: [email or web address]

## 2018-03-16 NOTE — DISCHARGE NOTE ADULT - MEDICATION SUMMARY - MEDICATIONS TO STOP TAKING
I will STOP taking the medications listed below when I get home from the hospital:  None I will STOP taking the medications listed below when I get home from the hospital:    lisinopril  -- 40 milligram(s) by mouth once a day

## 2018-03-16 NOTE — DISCHARGE NOTE ADULT - PLAN OF CARE
Discharge to home. To remain in normal rhythm. You presented to the hospital in atrial fibrillation. This was likely exacerbated by the influenza infection. You converted to a normal rhythm after receiving Cardizem one time. An echocardiogram was performed of your heart which showed preserved function. You were started on Eliquis, a blood thinner, to avoid formation of clots. Please continue to take Eliquis as prescribed and make a follow up appointment in 1-2 weeks for further evaluation of your heart rhythm. To remain symptom free. You presented with a cough and muscle aches. You were found to be flu positive at the urgent care center and started on Tamiflu. Please continue to take Tamiflu as prescribed for a total of 5 days of therapy to end on 3/19. Please follow up with your primary care doctor in 1 week for a post hospitalization visit. with Stable. Please continue to take your lisinopril as prescribed. You presented to the hospital in atrial fibrillation. This was likely exacerbated by the influenza infection. You converted to a normal rhythm after receiving Cardizem one time. An echocardiogram was performed of your heart which showed preserved function. You were started on Eliquis, a blood thinner, to avoid formation of clots. Please continue to take Eliquis as prescribed and make a follow up appointment in 1-2 weeks for further evaluation of your heart rhythm. Due to having episodes of atrial fibrillation, please start lopressor 12.5 mg twice a day while you have the flu. Please follow up with your cardiologist in 1-2 weeks for a check on your heart rhythm and blood pressure check. Please take the lopressor as prescribed above. Due to your blood pressure being on the lower side during your hospitalization, please stop taking the lisinopril until you see your cardiologist for a blood pressure check. You presented to the hospital in atrial fibrillation. This was likely exacerbated by the influenza infection. You converted to a normal rhythm after receiving Cardizem one time. An echocardiogram was performed of your heart which showed preserved function of 50-55%. You were started on Eliquis, a blood thinner, to avoid formation of clots. Please continue to take Eliquis as prescribed and make a follow up appointment in 1-2 weeks for further evaluation of your heart rhythm. Due to having episodes of atrial fibrillation, please start lopressor 12.5 mg twice a day while you have the flu. Please follow up with your cardiologist in 1-2 weeks for a check on your heart rhythm and blood pressure check.

## 2018-03-25 DIAGNOSIS — I10 ESSENTIAL (PRIMARY) HYPERTENSION: ICD-10-CM

## 2018-03-25 DIAGNOSIS — J10.89 INFLUENZA DUE TO OTHER IDENTIFIED INFLUENZA VIRUS WITH OTHER MANIFESTATIONS: ICD-10-CM

## 2018-03-25 DIAGNOSIS — Z87.891 PERSONAL HISTORY OF NICOTINE DEPENDENCE: ICD-10-CM

## 2018-03-25 DIAGNOSIS — J10.1 INFLUENZA DUE TO OTHER IDENTIFIED INFLUENZA VIRUS WITH OTHER RESPIRATORY MANIFESTATIONS: ICD-10-CM

## 2018-03-25 DIAGNOSIS — Z85.46 PERSONAL HISTORY OF MALIGNANT NEOPLASM OF PROSTATE: ICD-10-CM

## 2018-03-25 DIAGNOSIS — E86.0 DEHYDRATION: ICD-10-CM

## 2018-03-25 DIAGNOSIS — I48.0 PAROXYSMAL ATRIAL FIBRILLATION: ICD-10-CM

## 2018-03-25 DIAGNOSIS — N17.9 ACUTE KIDNEY FAILURE, UNSPECIFIED: ICD-10-CM

## 2020-07-08 NOTE — DISCHARGE NOTE ADULT - NSCORESITESY/N_GEN_A_CORE_RD
Chief complaint:   Chief Complaint   Patient presents with   • Consultation     R groin hernia       Vitals:  Visit Vitals  BP (!) 132/92 (BP Location: RUE - Right upper extremity, Patient Position: Sitting, Cuff Size: Regular)   Pulse 82   Temp 99.3 °F (37.4 °C) (Oral)   Wt 101.6 kg   SpO2 97%   BMI 30.38 kg/m²       HISTORY OF PRESENT ILLNESS     I am seeing the patient at the request of Dr. Ulrich in consultation. Chief complaint is right groin hernia. The patient is a 69 year old male who presents for formal consultation regarding a right groin hernia.  He noticed a bulge incidentally about 2 weeks ago.  He gets a slight twinge of pain in the area when he lifts or strains.  I did fix his recurrent left groin hernia back in October 2010.  Since then, he has developed AFib and is on Coumadin.  The patient denies any GI symptoms or any  symptoms.        Other significant problems:  Patient Active Problem List    Diagnosis Date Noted   • Myopia of both eyes with astigmatism and presbyopia 09/04/2019     Priority: Medium   • Mixed type age-related cataract, both eyes 09/04/2019     Priority: Low   • Long term (current) use of anticoagulants 09/27/2018     Priority: Low   • Encounter for therapeutic drug monitoring 09/27/2018     Priority: Low   • Chronic atrial fibrillation 09/27/2018     Priority: Low     12/13/12  [facility]/Cardioversion     [facility] Hooper Successful.  2016 pt refused cardioversion because of insurance issues.  Now chronic a. Fib.     • Long term current use of anticoagulant therapy 10/19/2017     Priority: Low   • MVP (mitral valve prolapse) 06/21/2017     Priority: Low     2014 per Echo mild     • Essential hypertension, benign 11/13/2012     Priority: Low       PAST MEDICAL, FAMILY AND SOCIAL HISTORY     Medications:  Current Outpatient Medications   Medication   • warfarin (COUMADIN) 5 MG tablet   • metoPROLOL succinate (TOPROL-XL) 50 MG 24 hr tablet   • aspirin 81 MG tablet   • Calcium Carbonate  Antacid (JANETT-SELTZER ANTACID PO)     No current facility-administered medications for this visit.      Facility-Administered Medications Ordered in Other Visits   Medication   • fentaNYL (SUBLIMAZE) injection   • MIDAZolam (VERSED) injection       Allergies:  ALLERGIES:   Allergen Reactions   • Tetracycline NAUSEA       Past Medical  History/Surgeries:  Past Medical History:   Diagnosis Date   • Arthritis     left shoulder   • Atrial fibrillation (CMS/HCC) 2012   • Benign neoplasm of colon 14    fecal material   • Diverticulosis of colon (without mention of hemorrhage) 14    sigmoid colon   • Hypertension    • Mixed type age-related cataract, both eyes 2019   • MR (mitral regurgitation)     mild   • Muscle ache    • Serum hepatitis        Past Surgical History:   Procedure Laterality Date   • Colonoscopy remove lesion by snare  2014    10 year recall, Dr CAITIE Tyler (per letter)   • Hernia repair Left     left inquinal hernia, by Dr. Viera. Prior repair 20 years ago   • Knee arthroscopy w/ meniscal repair      left knee   • Muscle biopsy  2013    right deltoid   • Uri/cardioversion  12    [place] [facility] Hooper Successful   • Tonsillectomy and adenoidectomy      as a child       Family History:  Family History   Problem Relation Age of Onset   • Diabetes Mother    • Myocardial Infarction Father    • Atrial Fibrilliation Sister    • Aneurysm Brother 55   • Diabetes Maternal Grandmother    • Diabetes Maternal Grandfather    • Coronary Artery Disease Paternal Grandfather         premature CAD   DENIES ANY OTHER FAMILY HISTORY OF HEART DISEASE, CANCER, BLEEDING DISORDERS OR REACTIONS TO ANESTHESIA.    Social History:  Social History     Tobacco Use   • Smoking status: Former Smoker     Packs/day: 2.50     Years: 30.00     Pack years: 75.00     Types: Cigarettes     Last attempt to quit: 1992     Years since quittin.6   • Smokeless tobacco: Never Used   Substance Use  Topics   • Alcohol use: Yes     Alcohol/week: 4.0 standard drinks     Types: 2 Standard drinks or equivalent, 2 Cans of beer per week     Frequency: 2-4 times a month     Drinks per session: 1 or 2     Binge frequency: Less than monthly     Comment:  weekends       REVIEW OF SYSTEMS     Review of Systems   Constitutional: Negative for chills, fever and unexpected weight change.        No sweats.   HENT: Negative for congestion, ear discharge, ear pain, hearing loss, nosebleeds, rhinorrhea, sinus pressure, tinnitus, trouble swallowing and voice change.    Eyes: Negative for discharge, redness, itching and visual disturbance.   Respiratory: Negative for cough, shortness of breath and wheezing.         No hemoptysis   Cardiovascular: Negative for chest pain, palpitations and leg swelling.        No murmur  No irregular heart rate.   Gastrointestinal: Negative for abdominal distention, abdominal pain, anal bleeding, blood in stool, constipation, diarrhea, nausea and vomiting.   Genitourinary: Negative for difficulty urinating, dysuria, frequency, hematuria, penile pain and penile swelling.        No Incontinence.   Musculoskeletal: Negative for arthralgias, back pain and joint swelling.   Skin: Negative for rash.        No new growths.   Neurological: Negative for dizziness, seizures, syncope, numbness and headaches.   Hematological: Negative for adenopathy. Does not bruise/bleed easily.   Psychiatric/Behavioral: Negative for sleep disturbance. The patient is not nervous/anxious.         No depression  No anxiety  No stress         PHYSICAL EXAM     Physical Exam  Constitutional:       Appearance: Normal appearance. He is well-developed.   HENT:      Right Ear: External ear normal.      Left Ear: External ear normal.      Nose: Nose normal.   Eyes:      General: Lids are normal.      Conjunctiva/sclera: Conjunctivae normal.      Pupils: Pupils are equal, round, and reactive to light.   Neck:      Musculoskeletal: Neck  supple.      Thyroid: No thyroid mass or thyromegaly.   Cardiovascular:      Rate and Rhythm: Normal rate and regular rhythm.      Heart sounds: Normal heart sounds. No murmur.   Pulmonary:      Effort: Pulmonary effort is normal.      Breath sounds: Normal breath sounds.   Abdominal:      General: There is no distension.      Palpations: Abdomen is soft. There is no mass.      Tenderness: There is no abdominal tenderness.      Hernia: A hernia is present. Hernia is present in the right inguinal area. There is no hernia in the left inguinal area.      Comments: On exam, patient has a moderate-to-large size right groin hernia that is reducible.  No recurrence on the left at this time.       Lymphadenopathy:      Cervical: No cervical adenopathy.      Upper Body:      Right upper body: No supraclavicular adenopathy.      Left upper body: No supraclavicular adenopathy.   Skin:     General: Skin is warm and dry.   Neurological:      Mental Status: He is alert and oriented to person, place, and time.   Psychiatric:         Speech: Speech normal.         Behavior: Behavior normal. Behavior is cooperative.         ASSESSMENT/PLAN     My impression is the patient does have a large right groin hernia.  Given the size and his symptoms, I would recommend repair.  I again discussed natural history of hernias and how I do the repair.  He was given the hernia brochure to review.  He is undergoing a planned cardiac workup next week.  Preoperative medical evaluation with Dr. Walton.  Pre-surgical consent visit with me.       No

## 2022-10-18 PROBLEM — C61 MALIGNANT NEOPLASM OF PROSTATE: Chronic | Status: ACTIVE | Noted: 2018-03-15

## 2022-10-18 PROBLEM — I10 ESSENTIAL (PRIMARY) HYPERTENSION: Chronic | Status: ACTIVE | Noted: 2018-03-15

## 2022-10-18 PROBLEM — I48.91 UNSPECIFIED ATRIAL FIBRILLATION: Chronic | Status: ACTIVE | Noted: 2018-03-15

## 2022-11-02 ENCOUNTER — NON-APPOINTMENT (OUTPATIENT)
Age: 77
End: 2022-11-02

## 2022-11-02 ENCOUNTER — APPOINTMENT (OUTPATIENT)
Dept: OPHTHALMOLOGY | Facility: CLINIC | Age: 77
End: 2022-11-02

## 2022-11-02 PROCEDURE — 92134 CPTRZ OPH DX IMG PST SGM RTA: CPT

## 2022-11-02 PROCEDURE — 92201 OPSCPY EXTND RTA DRAW UNI/BI: CPT

## 2022-11-02 PROCEDURE — 92004 COMPRE OPH EXAM NEW PT 1/>: CPT

## 2022-12-21 ENCOUNTER — NON-APPOINTMENT (OUTPATIENT)
Age: 77
End: 2022-12-21

## 2022-12-21 ENCOUNTER — APPOINTMENT (OUTPATIENT)
Dept: OPHTHALMOLOGY | Facility: CLINIC | Age: 77
End: 2022-12-21

## 2022-12-21 PROCEDURE — 92012 INTRM OPH EXAM EST PATIENT: CPT

## 2022-12-21 PROCEDURE — 92134 CPTRZ OPH DX IMG PST SGM RTA: CPT

## 2023-06-21 ENCOUNTER — APPOINTMENT (OUTPATIENT)
Dept: OPHTHALMOLOGY | Facility: CLINIC | Age: 78
End: 2023-06-21

## 2023-12-20 ENCOUNTER — APPOINTMENT (OUTPATIENT)
Dept: OPHTHALMOLOGY | Facility: CLINIC | Age: 78
End: 2023-12-20

## 2024-03-04 ENCOUNTER — INPATIENT (INPATIENT)
Facility: HOSPITAL | Age: 79
LOS: 2 days | Discharge: HOME | DRG: 291 | End: 2024-03-07
Attending: INTERNAL MEDICINE | Admitting: INTERNAL MEDICINE
Payer: MEDICARE

## 2024-03-04 VITALS
RESPIRATION RATE: 20 BRPM | HEIGHT: 62 IN | DIASTOLIC BLOOD PRESSURE: 74 MMHG | TEMPERATURE: 98 F | WEIGHT: 149.91 LBS | OXYGEN SATURATION: 98 % | SYSTOLIC BLOOD PRESSURE: 109 MMHG | HEART RATE: 157 BPM

## 2024-03-04 DIAGNOSIS — I11.0 HYPERTENSIVE HEART DISEASE WITH HEART FAILURE: ICD-10-CM

## 2024-03-04 DIAGNOSIS — R63.4 ABNORMAL WEIGHT LOSS: ICD-10-CM

## 2024-03-04 DIAGNOSIS — I48.19 OTHER PERSISTENT ATRIAL FIBRILLATION: ICD-10-CM

## 2024-03-04 DIAGNOSIS — R06.02 SHORTNESS OF BREATH: ICD-10-CM

## 2024-03-04 DIAGNOSIS — Z86.69 PERSONAL HISTORY OF OTHER DISEASES OF THE NERVOUS SYSTEM AND SENSE ORGANS: ICD-10-CM

## 2024-03-04 DIAGNOSIS — Z85.46 PERSONAL HISTORY OF MALIGNANT NEOPLASM OF PROSTATE: ICD-10-CM

## 2024-03-04 DIAGNOSIS — Z90.79 ACQUIRED ABSENCE OF OTHER GENITAL ORGAN(S): ICD-10-CM

## 2024-03-04 DIAGNOSIS — Z86.19 PERSONAL HISTORY OF OTHER INFECTIOUS AND PARASITIC DISEASES: ICD-10-CM

## 2024-03-04 DIAGNOSIS — I25.10 ATHEROSCLEROTIC HEART DISEASE OF NATIVE CORONARY ARTERY WITHOUT ANGINA PECTORIS: ICD-10-CM

## 2024-03-04 DIAGNOSIS — I34.0 NONRHEUMATIC MITRAL (VALVE) INSUFFICIENCY: ICD-10-CM

## 2024-03-04 DIAGNOSIS — Z79.899 OTHER LONG TERM (CURRENT) DRUG THERAPY: ICD-10-CM

## 2024-03-04 DIAGNOSIS — I50.21 ACUTE SYSTOLIC (CONGESTIVE) HEART FAILURE: ICD-10-CM

## 2024-03-04 DIAGNOSIS — H35.30 UNSPECIFIED MACULAR DEGENERATION: ICD-10-CM

## 2024-03-04 DIAGNOSIS — I48.91 UNSPECIFIED ATRIAL FIBRILLATION: ICD-10-CM

## 2024-03-04 DIAGNOSIS — Z79.01 LONG TERM (CURRENT) USE OF ANTICOAGULANTS: ICD-10-CM

## 2024-03-04 LAB
ADD ON TEST-SPECIMEN IN LAB: SIGNIFICANT CHANGE UP
ALBUMIN SERPL ELPH-MCNC: 4.2 G/DL — SIGNIFICANT CHANGE UP (ref 3.3–5)
ALP SERPL-CCNC: 82 U/L — SIGNIFICANT CHANGE UP (ref 40–120)
ALT FLD-CCNC: 18 U/L — SIGNIFICANT CHANGE UP (ref 10–45)
ANION GAP SERPL CALC-SCNC: 12 MMOL/L — SIGNIFICANT CHANGE UP (ref 5–17)
APTT BLD: 28.1 SEC — SIGNIFICANT CHANGE UP (ref 24.5–35.6)
AST SERPL-CCNC: 22 U/L — SIGNIFICANT CHANGE UP (ref 10–40)
BASOPHILS # BLD AUTO: 0.09 K/UL — SIGNIFICANT CHANGE UP (ref 0–0.2)
BASOPHILS NFR BLD AUTO: 0.9 % — SIGNIFICANT CHANGE UP (ref 0–2)
BILIRUB SERPL-MCNC: 0.8 MG/DL — SIGNIFICANT CHANGE UP (ref 0.2–1.2)
BUN SERPL-MCNC: 25 MG/DL — HIGH (ref 7–23)
CALCIUM SERPL-MCNC: 10 MG/DL — SIGNIFICANT CHANGE UP (ref 8.4–10.5)
CHLORIDE SERPL-SCNC: 106 MMOL/L — SIGNIFICANT CHANGE UP (ref 96–108)
CO2 SERPL-SCNC: 22 MMOL/L — SIGNIFICANT CHANGE UP (ref 22–31)
CREAT SERPL-MCNC: 1.21 MG/DL — SIGNIFICANT CHANGE UP (ref 0.5–1.3)
EGFR: 62 ML/MIN/1.73M2 — SIGNIFICANT CHANGE UP
EOSINOPHIL # BLD AUTO: 0.01 K/UL — SIGNIFICANT CHANGE UP (ref 0–0.5)
EOSINOPHIL NFR BLD AUTO: 0.1 % — SIGNIFICANT CHANGE UP (ref 0–6)
GLUCOSE SERPL-MCNC: 90 MG/DL — SIGNIFICANT CHANGE UP (ref 70–99)
HCT VFR BLD CALC: 41.4 % — SIGNIFICANT CHANGE UP (ref 39–50)
HGB BLD-MCNC: 13.6 G/DL — SIGNIFICANT CHANGE UP (ref 13–17)
IMM GRANULOCYTES NFR BLD AUTO: 0.5 % — SIGNIFICANT CHANGE UP (ref 0–0.9)
INR BLD: 1.05 — SIGNIFICANT CHANGE UP (ref 0.85–1.18)
LYMPHOCYTES # BLD AUTO: 1.19 K/UL — SIGNIFICANT CHANGE UP (ref 1–3.3)
LYMPHOCYTES # BLD AUTO: 12.3 % — LOW (ref 13–44)
MCHC RBC-ENTMCNC: 30.6 PG — SIGNIFICANT CHANGE UP (ref 27–34)
MCHC RBC-ENTMCNC: 32.9 GM/DL — SIGNIFICANT CHANGE UP (ref 32–36)
MCV RBC AUTO: 93.2 FL — SIGNIFICANT CHANGE UP (ref 80–100)
MONOCYTES # BLD AUTO: 0.71 K/UL — SIGNIFICANT CHANGE UP (ref 0–0.9)
MONOCYTES NFR BLD AUTO: 7.4 % — SIGNIFICANT CHANGE UP (ref 2–14)
NEUTROPHILS # BLD AUTO: 7.6 K/UL — HIGH (ref 1.8–7.4)
NEUTROPHILS NFR BLD AUTO: 78.8 % — HIGH (ref 43–77)
NRBC # BLD: 0 /100 WBCS — SIGNIFICANT CHANGE UP (ref 0–0)
NT-PROBNP SERPL-SCNC: 9229 PG/ML — HIGH (ref 0–300)
PLATELET # BLD AUTO: 233 K/UL — SIGNIFICANT CHANGE UP (ref 150–400)
POTASSIUM SERPL-MCNC: 4.9 MMOL/L — SIGNIFICANT CHANGE UP (ref 3.5–5.3)
POTASSIUM SERPL-SCNC: 4.9 MMOL/L — SIGNIFICANT CHANGE UP (ref 3.5–5.3)
PROT SERPL-MCNC: 7.2 G/DL — SIGNIFICANT CHANGE UP (ref 6–8.3)
PROTHROM AB SERPL-ACNC: 11.9 SEC — SIGNIFICANT CHANGE UP (ref 9.5–13)
RBC # BLD: 4.44 M/UL — SIGNIFICANT CHANGE UP (ref 4.2–5.8)
RBC # FLD: 13.4 % — SIGNIFICANT CHANGE UP (ref 10.3–14.5)
SODIUM SERPL-SCNC: 140 MMOL/L — SIGNIFICANT CHANGE UP (ref 135–145)
TROPONIN T, HIGH SENSITIVITY RESULT: 16 NG/L — SIGNIFICANT CHANGE UP (ref 0–51)
WBC # BLD: 9.65 K/UL — SIGNIFICANT CHANGE UP (ref 3.8–10.5)
WBC # FLD AUTO: 9.65 K/UL — SIGNIFICANT CHANGE UP (ref 3.8–10.5)

## 2024-03-04 PROCEDURE — 71045 X-RAY EXAM CHEST 1 VIEW: CPT | Mod: 26

## 2024-03-04 PROCEDURE — 99291 CRITICAL CARE FIRST HOUR: CPT

## 2024-03-04 RX ORDER — APIXABAN 2.5 MG/1
1 TABLET, FILM COATED ORAL
Qty: 60 | Refills: 0
Start: 2024-03-04 | End: 2024-04-02

## 2024-03-04 RX ORDER — APIXABAN 2.5 MG/1
5 TABLET, FILM COATED ORAL EVERY 12 HOURS
Refills: 0 | Status: DISCONTINUED | OUTPATIENT
Start: 2024-03-04 | End: 2024-03-07

## 2024-03-04 RX ORDER — PANTOPRAZOLE SODIUM 20 MG/1
1 TABLET, DELAYED RELEASE ORAL
Refills: 0 | DISCHARGE

## 2024-03-04 RX ORDER — METOPROLOL TARTRATE 50 MG
25 TABLET ORAL ONCE
Refills: 0 | Status: COMPLETED | OUTPATIENT
Start: 2024-03-04 | End: 2024-03-04

## 2024-03-04 RX ORDER — METOPROLOL TARTRATE 50 MG
5 TABLET ORAL ONCE
Refills: 0 | Status: COMPLETED | OUTPATIENT
Start: 2024-03-04 | End: 2024-03-04

## 2024-03-04 RX ORDER — PANTOPRAZOLE SODIUM 20 MG/1
40 TABLET, DELAYED RELEASE ORAL
Refills: 0 | Status: DISCONTINUED | OUTPATIENT
Start: 2024-03-04 | End: 2024-03-07

## 2024-03-04 RX ORDER — APIXABAN 2.5 MG/1
5 TABLET, FILM COATED ORAL ONCE
Refills: 0 | Status: COMPLETED | OUTPATIENT
Start: 2024-03-04 | End: 2024-03-04

## 2024-03-04 RX ORDER — LANOLIN ALCOHOL/MO/W.PET/CERES
3 CREAM (GRAM) TOPICAL ONCE
Refills: 0 | Status: COMPLETED | OUTPATIENT
Start: 2024-03-04 | End: 2024-03-04

## 2024-03-04 RX ORDER — METOPROLOL TARTRATE 50 MG
25 TABLET ORAL THREE TIMES A DAY
Refills: 0 | Status: DISCONTINUED | OUTPATIENT
Start: 2024-03-04 | End: 2024-03-05

## 2024-03-04 RX ADMIN — Medication 25 MILLIGRAM(S): at 21:50

## 2024-03-04 RX ADMIN — APIXABAN 5 MILLIGRAM(S): 2.5 TABLET, FILM COATED ORAL at 14:18

## 2024-03-04 RX ADMIN — Medication 3 MILLIGRAM(S): at 22:27

## 2024-03-04 RX ADMIN — Medication 5 MILLIGRAM(S): at 14:18

## 2024-03-04 RX ADMIN — Medication 25 MILLIGRAM(S): at 15:17

## 2024-03-04 NOTE — H&P ADULT - NSICDXPASTMEDICALHX_GEN_ALL_CORE_FT
PAST MEDICAL HISTORY:  Afib     Hepatitis C virus     HTN (hypertension)     Prostate cancer s/p prostatectomy

## 2024-03-04 NOTE — ED PROVIDER NOTE - CRITICAL CARE ATTENDING CONTRIBUTION TO CARE
Upon my evaluation, this patient had a high probability of imminent or life-threatening deterioration due to atrial fibrillation with RVR and congestive heart failure requiring IV beta blockers which required my direct attention, intervention, and personal management.    I have personally provided critical care time exclusive of time spent on separately billable procedures. Time includes review of laboratory data, radiology results, discussion with consultants, and monitoring for potential decompensation. Interventions were performed as documented above.

## 2024-03-04 NOTE — PATIENT PROFILE ADULT - FALL HARM RISK - UNIVERSAL INTERVENTIONS
Bed in lowest position, wheels locked, appropriate side rails in place/Call bell, personal items and telephone in reach/Instruct patient to call for assistance before getting out of bed or chair/Non-slip footwear when patient is out of bed/[place] to call system/Physically safe environment - no spills, clutter or unnecessary equipment/Purposeful Proactive Rounding/Room/bathroom lighting operational, light cord in reach

## 2024-03-04 NOTE — H&P ADULT - PROBLEM SELECTOR PLAN 1
Initially, rapid AF w/ HR in 130-150 bpm. Now rates 110-130 bpm.  -S/p lopressor 25 mg x 1, lopressor 5 mg IV x 1 in ED.  -Rate control: Start lopressor 25 mg TID  -AC: Start eliquis 5 mg BID (CHADSVASC 3)  -NPO after MN for KATHY/DCCV with Dr. Oliva.

## 2024-03-04 NOTE — CONSULT NOTE ADULT - PROBLEM SELECTOR RECOMMENDATION 9
Pt with hx of paroxysmal afib, has not been on A/C for years, presenting with SOB, now in afib with RVR, with a ventricular rate to 150s bpm   - Continue lopressor 25 mg TID   - Start eliquis 5 mg BID  - NPO after midnight for KATHY/DCCV with Dr. Oliva

## 2024-03-04 NOTE — ED PROVIDER NOTE - PHYSICAL EXAMINATION
CONSTITUTIONAL: Well-appearing; in no apparent distress.   HEAD: Normocephalic; atraumatic.   EYES:  conjunctiva and sclera clear  ENT: normal nose; no rhinorrhea; normal pharynx with no erythema or lesions.   NECK: Supple; non-tender;   CARDIOVASCULAR: rapid rate, irregular, no obvious murmur  RESPIRATORY: Breathing easily; breath sounds clear and equal bilaterally; no wheezes, rhonchi, or rales.  GI: Soft; non-distended; non-tender; no palpable organomegaly.   EXT: No cyanosis or edema; N/V intact  SKIN: Normal for age and race; warm; dry; good turgor; no apparent lesions or rash.   NEURO: A & O x 3; face symmetric; grossly unremarkable.   PSYCHOLOGICAL: The patient’s mood and manner are appropriate.

## 2024-03-04 NOTE — H&P ADULT - PROBLEM SELECTOR PLAN 2
Clinically appears euvolemic with warm extremities. spO2 99% RA, Lungs clear, no LE edema  -Diuretic: None, monitor fluid status daily  -pro BNP 9229  -EF on outpatient echo 10% per MD. Full TTE ordered to further assess.   -Etiology: Likely Tachy-induced CM  -Initiate GDMT when able to tolerate  -Strict I&Os, daily weights, fluid restriction, core measures    F: NONE  D: DASH diet. NPO after MN  Lytes keep K >4, Mg >2  DVT ppx: Eliquis    Dispo: Pending clinical progression Clinically appears euvolemic with warm extremities. spO2 99% RA, Lungs clear, no edema  -Diuretic: None  -pro BNP 9229  -EF on outpatient echo 10% per MD. Full TTE ordered to further assess.   -Etiology: Likely Tachy-induced CM  -Initiate GDMT when able to tolerate  -Strict I&Os, daily weights, fluid restriction, core measures    F: NONE  D: DASH diet. NPO after MN  Lytes keep K >4, Mg >2  DVT ppx: Eliquis    Dispo: Pending clinical progression Clinically euvolemic with warm extremities. spO2 99% RA, Lungs clear, no edema  -Diuretic: None  -pro BNP 9229  -EF on outpatient echo 10% per MD. Full TTE ordered to further assess.   -Etiology: Likely Tachy-induced CM  -Initiate GDMT when able to tolerate  -Strict I&Os, daily weights, fluid restriction, core measures    F: NONE  D: DASH diet. NPO after MN  Lytes keep K >4, Mg >2  DVT ppx: Eliquis    Dispo: Pending clinical progression

## 2024-03-04 NOTE — ED ADULT NURSE NOTE - OBJECTIVE STATEMENT
79 yo M pmhx afib on metoprolol but no AC sent for admission by EP for Afib with new onset CHF. Pt reports has been ahving some decreased activity tolerance for past 4 months, 10lb unintentional weight loss over past 2 months and feeling generally fatigued. Had appt with cardiologist/ EP today for first eval for these sx, was found to be in afib w rvr and sent to ED. Pt reporting mild midsternal CP with intermittent palpitations.

## 2024-03-04 NOTE — ED PROVIDER NOTE - CLINICAL SUMMARY MEDICAL DECISION MAKING FREE TEXT BOX
here w/ afib w/ RVR, likely with concomitant CHF as well  cxr without volume overload and no hypoxia   will plan for IV and PO beta blockade for rate control  hold fluids   start anticoagulation  case d/w with pts EP Dr. Oliva, and his NP for KATHY planning

## 2024-03-04 NOTE — ED PROVIDER NOTE - DISCUSSED CASE WITH MULTISELECT OPTIONS
NEUROLOGY OUTPATIENT PROGRESS NOTE  Jun 7, 2023     CHIEF COMPLAINT/REASON FOR VISIT/REASON FOR CONSULT  Patient presents with:  Follow Up    REASON FOR CONSULTATION- Diplopia    REFERRAL SOURCE  Dr. Cartwright  CC Dr. Cartwright    HISTORY OF PRESENT ILLNESS  Luis Azevedo is a 69 year old male seen today for evaluation of double vision.  Double vision came on about a week ago.  This happened while he was driving.  There was no provoking factors.  Denies any major headaches denies any neck pain.  Given the vision loss there is no other neurological symptoms.  No associated chest pains or palpitations.  Does have a history of hypertension though it is under good control.  His diabetes is also under good control.  A1c has been 6.6.  He did present to the emergency room and an MRI of the brain was done which was negative for any structural lesions.  MRA was negative for aneurysms.  ESR was checked which was slightly elevated.  This chronically does run high for him.  Does report mild improvement in his symptoms though no major worsening.  Has not been able to see the eye doctor.  Does have a peripheral neuropathy due to diabetes.  Has not had similar symptoms in the past.    Double vision is horizontal.  Closing 1 eye the double vision does go away.  Does complain more of it towards the left side.    6/7/23  Patient reports improvement in his diplopia.  Has seen Dr. Francis at the [facility].  Is getting a prism of his left eye.  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  No other new symptoms.  Does have a diabetic neuropathy.  This does affect his balance.  Occasionally when he turns his head too fast he will feel vertigo.  Discussed about benign positional paroxysmal vertigo and wants to hold off on therapy.    Previous history is reviewed and this is unchanged.    PAST MEDICAL/SURGICAL HISTORY  History reviewed. No pertinent past medical history.  Patient Active Problem List   Diagnosis      Alternating esotropia     Hypotropia of right eye   Significant diabetes, high blood pressure, high cholesterol, sleep disorder, sleep apnea.  Consider sleep    FAMILY HISTORY  History reviewed. No pertinent family history.   Family history positive for stroke in his grandfather.    SOCIAL HISTORY  Social History     Tobacco Use     Smoking status: Never     Smokeless tobacco: Never   Substance Use Topics     Alcohol use: Yes     Comment: minimal       SYSTEMS REVIEW  Twelve-system ROS was done and other than the HPI this was negative except vision symptoms.  No new symptoms/issues.    MEDICATIONS  diclofenac (VOLTAREN) 75 MG EC tablet, TAKE 1 TABLET (75 MG) BY MOUTH TWICE A DAY  lisinopril-hydrochlorothiazide (ZESTORETIC) 20-25 MG tablet, Take 1 tablet by mouth daily  metFORMIN (GLUCOPHAGE) 500 MG tablet, TAKE 1 TABLET (500 MG) BY MOUTH TWICE A DAY WITH MEALS  rosuvastatin (CRESTOR) 5 MG tablet, Take 5 mg by mouth    No current facility-administered medications on file prior to visit.       PHYSICAL EXAMINATION  VITALS: /68   Pulse 72   Resp 18   Wt 108 kg (238 lb)   BMI 34.15 kg/m    GENERAL: Healthy appearing, alert, no acute distress, normal habitus.  CARDIOVASCULAR: Extremities warm and well perfused. Pulses present.   NEUROLOGICAL:  Patient is awake and oriented to self, place and time.  Attention span is normal.  Memory is grossly intact.  Language is fluent and follows commands appropriately.  Appropriate fund of knowledge. Cranial nerves 2-12 are intact.  Has prisms on-right side.  There is no pronator drift.  Motor exam shows 5/5 strength in all extremities.  Tone is symmetric bilaterally in upper and lower extremities.  (Previously reflexes are symmetric and absent in upper extremities and lower extremities. Sensory exam is grossly intact to light touch, pin prick and vibration.)  Finger to nose and heel to shin is without dysmetria.  Romberg is negative.  Gait is normal and the patient is able  to do tandem walk and walk on toes and heels with mild difficulty.  Exam stable/improved compared to before.    DIAGNOSTICS  MRI-images reviewed.  No acute structural lesion noted.  HEAD MRI:   1.  No acute intracranial finding.  2.  Mild presumed chronic microvascular ischemic change.     HEAD MRA:   1.  Apparent moderate short-segment stenosis involving a proximal M2 segment inferior division branch of the left MCA, favored to be artifactual.  2.  No additional high-grade stenosis. No large vessel occlusion or aneurysm.    RELEVANT LABS  Component      Latest Ref Rng & Units 3/19/2023   WBC      4.0 - 11.0 10e3/uL 7.5   RBC Count      4.40 - 5.90 10e6/uL 5.12   Hemoglobin      13.3 - 17.7 g/dL 14.4   Hematocrit      40.0 - 53.0 % 43.0   MCV      78 - 100 fL 84   MCH      26.5 - 33.0 pg 28.1   MCHC      31.5 - 36.5 g/dL 33.5   RDW      10.0 - 15.0 % 13.5   Platelet Count      150 - 450 10e3/uL 227   % Neutrophils      % 57   % Lymphocytes      % 34   % Monocytes      % 7   % Eosinophils      % 2   % Basophils      % 0   % Immature Granulocytes      % 0   NRBCs per 100 WBC      <1 /100 0   Absolute Neutrophils      1.6 - 8.3 10e3/uL 4.2   Absolute Lymphocytes      0.8 - 5.3 10e3/uL 2.5   Absolute Monocytes      0.0 - 1.3 10e3/uL 0.5   Absolute Eosinophils      0.0 - 0.7 10e3/uL 0.1   Absolute Basophils      0.0 - 0.2 10e3/uL 0.0   Absolute Immature Granulocytes      <=0.4 10e3/uL 0.0   Absolute NRBCs      10e3/uL 0.0   Sodium      136 - 145 mmol/L 141   Potassium      3.5 - 5.0 mmol/L 3.5   Chloride      98 - 107 mmol/L 105   Carbon Dioxide      22 - 31 mmol/L 22   Anion Gap      5 - 18 mmol/L 14   Urea Nitrogen      8 - 22 mg/dL 17   Creatinine      0.70 - 1.30 mg/dL 0.78   Calcium      8.5 - 10.5 mg/dL 9.4   Glucose      70 - 125 mg/dL 109   GFR Estimate      >60 mL/min/1.73m2 >90   Sed Rate      0 - 15 mm/hr 30 (H)   SELINA interpretation      Negative Negative   CRP      0.0 - <0.8 mg/dL 0.3         OUTSIDE  RECORDS  Outside referral notes and chart notes were reviewed and pertinent information has been summarized (in addition to the HPI):-        Component      Latest Ref Rng 3/21/2023  1:42 PM   Neutrophil Cytoplasmic Antibody      <1:10  <1:10    Neutrophil Cytoplasmic Antibody Pattern The ANCA IFA is <1:10. No further testing will be performed.    Hemoglobin A1C      <5.7 % 6.5 (H)    Angiotensin Converting Enzyme      16 - 85 U/L <10 (L)    SELINA interpretation      Negative  Negative    Lyme Disease Antibodies Serum      <0.90  0.13    TSH      0.30 - 5.00 uIU/mL 1.85    Vitamin B1 Whole Blood Level      70 - 180 nmol/L 103    Vitamin B12      232 - 1,245 pg/mL 476       Legend:  (H) High  (L) Low    IMPRESSION/REPORT/PLAN  Diplopia  Left cranial nerve VI palsy  History of diabetes and hypertension  History of positive SELINA  Elevated ESR  BPPV    This is a 69 year old male with new onset of horizontal binocular double vision.  Exam shows increased double vision on the left side.  He has been diagnosed to have a left cranial nerve VI palsy.  He is slowly making improvement.  Suspect this to be microvascular in nature from his diabetes and hypertension.  MRI brain was negative for structural lesions.  ESR was elevated.  Blood work was otherwise noncontributory.  Would recommend continuing follow-up with Dr. Francis for prism adjustment.  Discussed prognosis.    He further complains of vertigo with head movement.  Suggestive of BPPV.  Currently this is not too bothersome though if he would like he could see a physical therapist for vestibular rehab.     Recommend continuing aggressive control of his diabetes and hypertension.  Vascular risk factor management through primary care.  Aspirin would be optional as it is generally not standard of care for microvascular cranial neuropathies.  Exercise and healthy lifestyle.    Return on as-needed basis.    Return if symptoms worsen or fail to improve, for In-Clinic Visit (must),  Return to PCP or referring provider.    Over 30 minutes were spent coordinating the care for the patient on the day of the encounter.  This includes previsit, during visit and post visit activities as documented above.  Counseling patient.  Reviewing chart/ophthalmology notes.  Blood work reviewed.  Multiple problems addressed.  (Activities include but not inclusive of reviewing chart, reviewing outside records, reviewing labs and imaging study results as well as the images, patient visit time including getting history and exam,  use if applicable, review of test results with the patient and coming up with a plan in a shared model, counseling patient and family, education and answering patient questions, EMR , EMR diagnosis entry and problem list management, medication reconciliation and prescription management if applicable, paperwork if applicable, printing documents and documentation of the visit activities.)        Amando Finney MD  Neurologist  [facility]  Tel:- [phone]    This note was dictated using voice recognition software.  Any grammatical or context distortions are unintentional and inherent to the software.     Admitting MD/Consultant

## 2024-03-04 NOTE — ED ADULT NURSE NOTE - OTHER COMPLAINTS
pt arrived to ER c/o CP for months and having palpitations today as per pt. Pt reports no SOB, dizziness and no other medical symptoms at this moment. Pt's MD sent pt over for new onset Afib RVR and CHF (EF of 10%) as per pt. Pt denies being on blood thinners. Pt noted to be able to maintain airway, having nonlabored breathing, no retractions noted, non diaphoretic, and able to talk in clear full sentences. pt immediately upgraded to MD lau and placed on monitoring.

## 2024-03-04 NOTE — ED ADULT TRIAGE NOTE - RESPIRATORY RATE (BREATHS/MIN)
Hospitalist Progress Note-critical care note     Patient: Chinyere Babb MRN: [id number]  CSN: [id number]    YOB: 1963  Age: 50 y.o. Sex: female    DOA: 4/12/2018 LOS:  LOS: 3 days            Chief complaint: c diff , diverticulitis of sigmoid , hypokalemia     Assessment/Plan         Hospital Problems  Date Reviewed: 4/14/2018          Codes Class Noted POA    C. difficile colitis ICD-10-CM: A04.72  ICD-9-CM: 008.45  4/13/2018 Unknown        Hypokalemia ICD-10-CM: E87.6  ICD-9-CM: 276.8  4/13/2018 Unknown        * (Principal)Diverticulitis of sigmoid colon ICD-10-CM: K57.32  ICD-9-CM: 562.11  4/12/2018 Yes        Perforation of sigmoid colon due to diverticulitis ICD-10-CM: K57.20  ICD-9-CM: 562.11  4/12/2018 Yes        Chronic hepatitis C ([facility].) ICD-10-CM: B18.2  ICD-9-CM: 070.54  9/16/2015 Yes            1.  Sigmoid diverticulitis with microperforation./ c diff colitis   Post day 1 COLON RESECTION OPEN LOW ANTERIOR RESECTION, SPLEENIC FLEXTURE MOBILIZATION. Will continue zosyn and flagyl ,   Will check cbc and bmp /mg           2. History of hepatitis C.  3.  Hypertension. Continue home meds,    4 hypokalemia   K replacement , will check bmp today     Subjective : not passing gas     Nurse: no acute issue   Disposition :2-3 days     Will have pt/ot,     Review of systems:    General: No fevers or chills. Cardiovascular: No chest pain or pressure. No palpitations. Pulmonary: No shortness of breath. Gastrointestinal: No nausea, vomiting. +abdomen pain     Vital signs/Intake and Output:  Visit Vitals    /82    Pulse 89    Temp 98 °F (36.7 °C)    Resp 18    Ht 5' 5\" (1.651 m)    Wt 90.4 kg (199 lb 3.2 oz)    SpO2 91%    Breastfeeding No    BMI 33.15 kg/m2     Current Shift:  04/15 0701 - 04/15 1900  In: -   Out: 405 [Urine:350; Drains:55]  Last three shifts:  04/13 1901 - 04/15 0700  In: 2415 [I.V.:6720]  Out: 5736 [Urine:1175; Drains:30]    Physical Exam:  General: WD, WN. Alert, cooperative, no acute distress    HEENT: NC, Atraumatic. PERRLA, anicteric sclerae. Lungs: CTA Bilaterally. No Wheezing/Rhonchi/Rales. Heart:  Regular  rhythm,  No murmur, No Rubs, No Gallops  Abdomen: Soft, no distended, mild  tender. No Bowel sounds, surgical wound covered with Gauze   Extremities: No c/c/e  Psych:   Not anxious or agitated. Neurologic:  No acute neurological deficit. Labs: Results:       Chemistry Recent Labs      04/13/18   0355   GLU  88   NA  140   K  3.4*   CL  106   CO2  26   BUN  9   CREA  0.87   CA  8.1*   AGAP  8   BUCR  10*      CBC w/Diff Recent Labs      04/13/18   0355   WBC  7.9   RBC  4.09*   HGB  11.4*   HCT  35.1   PLT  126*      Cardiac Enzymes No results for input(s): CPK, CKND1, GHASSAN in the last 72 hours. No lab exists for component: CKRMB, TROIP   Coagulation No results for input(s): PTP, INR, APTT in the last 72 hours. No lab exists for component: INREXT, INREXT    Lipid Panel No results found for: CHOL, CHOLPOCT, CHOLX, CHLST, CHOLV, 366871, HDL, LDL, LDLC, DLDLP, 382818, VLDLC, VLDL, TGLX, TRIGL, TRIGP, TGLPOCT, CHHD, CHHDX   BNP No results for input(s): BNPP in the last 72 hours. Liver Enzymes No results for input(s): TP, ALB, TBIL, AP, SGOT, GPT in the last 72 hours.     No lab exists for component: DBIL   Thyroid Studies No results found for: T4, T3U, TSH, TSHEXT, TSHEXT     Procedures/imaging: see electronic medical records for all procedures/Xrays and details which were not copied into this note but were reviewed prior to creation of Kari Mott MD 20

## 2024-03-04 NOTE — ED PROVIDER NOTE - CARE PLAN
Principal Discharge DX:	Atrial fibrillation with RVR  Secondary Diagnosis:	CHF (congestive heart failure)   1

## 2024-03-04 NOTE — CONSULT NOTE ADULT - ASSESSMENT
77 yo M w/ PMHx of HTN, paroxysmal afib (not on A/C), prostate cancer (s/p prostatectomy), who was sent in from his electrophysiologist, Dr. Oliva for afib with RVR and new onset CHF (EF: 10% as done per outpatient echo). Ep consulted for atrial fibrillation rate and rhythm management.       The DCCV procedure, along with associated risks, including but not limited to stroke and flash pulmonary edema were discussed. All questions answered and informed consent signed.

## 2024-03-04 NOTE — H&P ADULT - HISTORY OF PRESENT ILLNESS
79 yo M w/ PMHx of HTN, paroxysmal afib (not on A/C), prostate cancer (s/p prostatectomy), macular degeneration (s/p recent operation), Hep C (s/p tx), was sent to the ED by Dr. Oliva for afib with RVR and new onset CHF (outpatient echo showing EF of 10%). Pt was attending f/u appointment with his cardiologist, Dr. Olivo, c/o worsening SOB since July. He says he feels especially SOB when walking on an incline but he can walk on level ground ~1 mile without symptoms. Pt also endorses intermittent palpitations but states this has been an ongoing issue for many years. He was diagnosed with AFib in 2018 (echo showing EF of 45-50% at this time) and was started on eliquis which he has since discontinued. Pt says he had an unknown viral illness ~2 weeks ago with fevers that has since resolved. He also admits to recent weight loss. Pt denies chest pain, PND/orthopnea, dizziness, lightheadedness, leg swelling, fevers/chills.   In ED patient was found to be in AFib with RVR with HR in the 130-140s. VSS. Labs remarkable for a BNP of 9229, HsTnT 16. Patient was given Lopressor 25mg PO x1 and Lopressor 5mg IVP. Pt was started on Eliquis 5mg PO. Patient admitted to cardiac telemetry with plan for KATHY/DCCV tomorrow with Dr. Oliva.     Previous cardiac studies:   TTE 03/15/18: Normal LV size and wall thickness, mild global hypokinesis LV, EF 45-50%, normal left atrial size, normal right atrial size, trace to mild AI. 79 yo M w/ PMHx of HTN, paroxysmal afib (not on A/C), prostate cancer (s/p prostatectomy), macular degeneration (s/p recent operation), Hep C (s/p tx), was sent to the ED by his cardiologist Dr. Oliva for afib with RVR and new onset CHF (outpatient echo showing EF of 10%).   Pt admits to worsening SOB since July. Dyspnea exacerbated when walking on incline, but he can walk on level ground ~1 mile without symptoms. Pt also endorses intermittent palpitations but states this has been an ongoing issue for many years. He was diagnosed with AFib in 2018 (echo showing EF of 45-50% at this time) and was started on eliquis which he has since discontinued. Pt says he had an unknown viral illness ~2 weeks ago with fevers that has since resolved. He also admits to recent weight loss. Pt denies chest pain, PND/orthopnea, dizziness, lightheadedness, leg swelling, fevers/chills.     In ED, patient was found to be in AFib with RVR with HR in the 130-140s. VSS. Labs remarkable for a BNP of 9229, HsTnT 16. Patient was given Lopressor 25mg PO x1 and Lopressor 5mg IVP. Pt was started on Eliquis 5mg PO. Patient admitted to cardiac telemetry with plan for KATHY/DCCV tomorrow with Dr. Oliva.     Previous cardiac studies:   TTE 03/15/18: Normal LV size and wall thickness, mild global hypokinesis LV, EF 45-50%, normal left atrial size, normal right atrial size, trace to mild AI.

## 2024-03-04 NOTE — H&P ADULT - ASSESSMENT
79 y/o M w/ PMHx of HTN, PAF (not on A/C), prostate CA (s/p prostatectomy), macular degeneration (s/p recent operation), Hep C (s/p tx), sent to ED from cardiology office for rapid afib and new onset CHF (outpatient echo showing EF of 10%). Admitted to cardiac telemetry with plan for KATHY/DCCV with Dr. Oliva 3/5/24.

## 2024-03-04 NOTE — CONSULT NOTE ADULT - SUBJECTIVE AND OBJECTIVE BOX
EPS Consult Note     HPI:  77 yo M w/ PMHx of HTN, paroxysmal afib (not on A/C), prostate cancer (s/p prostatectomy), who was sent in from his electrophysiologist, Dr. Oliva for afib with RVR and new onset CHF (EF: 10% as done per outpatient echo). Patient noticed increased shortness of breath for the past few weeks becoming worse today. Patient also states he has had months of diarrhea, ANN, and SOB. Patient without CP, dizziness, headache, current diarrhea, LE edema, palpitations, or other complaints.     PAST MEDICAL & SURGICAL HISTORY:  Afib  HTN (hypertension)  Prostate cancer  s/p prostatectomy    No significant past surgical history    No pertinent family history in first degree relatives    pertinent home medications: metoprolol succinate 12.5 mg daily     Inpatient Medications:   metoprolol tartrate 25 milliGRAM(s) Oral Once    Allergies: No Known Allergies    ROS:   CONSTITUTIONAL: No fever, weight loss + fatigue  EYES: Pt denies  RESPIRATORY: No cough, wheezing, chills or hemoptysis; No Shortness of Breath  CARDIOVASCULAR: see HPI  GASTROINTESTINAL: Pt denies  NEUROLOGICAL: Pt denies  SKIN: Pt denies   PSYCHIATRIC: Pt denies  HEME/LYMPH: Pt denies    PHYSICAL:  T(C): 36.6 (03-04-24 @ 13:47), Max: 36.6 (03-04-24 @ 13:47)  HR: 120 (03-04-24 @ 14:31) (120 - 161)  BP: 115/91 (03-04-24 @ 14:31) (109/74 - 135/101)  RR: 18 (03-04-24 @ 14:31) (18 - 20)  SpO2: 97% (03-04-24 @ 14:31) (97% - 99%)  Wt(kg): --    Appearance: No acute distress, well developed  Eyes: normal appearing conjunctiva, pupils and eyelids  Cardiovascular: + irregularly, irregular, tachycardia   Respiratory: + bibasilar rales   Gastrointestinal:  Soft, NT/ND 	  Neurologic:  No deficit noted  Psych: A&Ox3, normal mood/affect  Musculoskeletal: normal gait  Skin: no rash noted, normal color and pigmentation.        LABS:                        13.6   9.65  )-----------( 233      ( 04 Mar 2024 14:22 )             41.4     03-04    140  |  106  |  25<H>  ----------------------------<  90  4.9   |  22  |  1.21    Ca    10.0      04 Mar 2024 14:22    TPro  7.2  /  Alb  4.2  /  TBili  0.8  /  DBili  x   /  AST  22  /  ALT  18  /  AlkPhos  82  03-04    PT/INR - ( 04 Mar 2024 14:22 )   PT: 11.9 sec;   INR: 1.05          PTT - ( 04 Mar 2024 14:22 )  PTT:28.1 sec  Troponin 16     EKG: Afib with RVR     Telemetry: Afib with RVR     ECHO: pending

## 2024-03-04 NOTE — ED PROVIDER NOTE - OBJECTIVE STATEMENT
79 yo M with history of HTN, prostate Ca, paroxysmal atrial fibrillation sent in from cardiologists office for afib with RVR and new onset CHF w/ EF 10%. Is still on metoprolol 12.5 outpatient but has not been on blood thinners for a few years now as was in sinus rhythm. Noticed over the past few months diarrhea and ANN, SOB. Saw cardiologist today Dr. Oliva who sent immediately to the ED. Denies leg edema.

## 2024-03-04 NOTE — H&P ADULT - NSHPLABSRESULTS_GEN_ALL_CORE
LABS:                          13.6   9.65  )-----------( 233      ( 04 Mar 2024 14:22 )             41.4     03-04    140  |  106  |  25<H>  ----------------------------<  90  4.9   |  22  |  1.21    Ca    10.0      04 Mar 2024 14:22    TPro  7.2  /  Alb  4.2  /  TBili  0.8  /  DBili  x   /  AST  22  /  ALT  18  /  AlkPhos  82  03-04    LIVER FUNCTIONS - ( 04 Mar 2024 14:22 )  Alb: 4.2 g/dL / Pro: 7.2 g/dL / ALK PHOS: 82 U/L / ALT: 18 U/L / AST: 22 U/L / GGT: x           PT/INR - ( 04 Mar 2024 14:22 )   PT: 11.9 sec;   INR: 1.05          PTT - ( 04 Mar 2024 14:22 )  PTT:28.1 sec  Urinalysis Basic - ( 04 Mar 2024 14:22 )    Color: x / Appearance: x / SG: x / pH: x  Gluc: 90 mg/dL / Ketone: x  / Bili: x / Urobili: x   Blood: x / Protein: x / Nitrite: x   Leuk Esterase: x / RBC: x / WBC x   Sq Epi: x / Non Sq Epi: x / Bacteria: x

## 2024-03-05 ENCOUNTER — RESULT REVIEW (OUTPATIENT)
Age: 79
End: 2024-03-05

## 2024-03-05 LAB
A1C WITH ESTIMATED AVERAGE GLUCOSE RESULT: 5.5 % — SIGNIFICANT CHANGE UP (ref 4–5.6)
ANION GAP SERPL CALC-SCNC: 10 MMOL/L — SIGNIFICANT CHANGE UP (ref 5–17)
BUN SERPL-MCNC: 22 MG/DL — SIGNIFICANT CHANGE UP (ref 7–23)
CALCIUM SERPL-MCNC: 9.1 MG/DL — SIGNIFICANT CHANGE UP (ref 8.4–10.5)
CHLORIDE SERPL-SCNC: 108 MMOL/L — SIGNIFICANT CHANGE UP (ref 96–108)
CHOLEST SERPL-MCNC: 155 MG/DL — SIGNIFICANT CHANGE UP
CO2 SERPL-SCNC: 20 MMOL/L — LOW (ref 22–31)
CREAT SERPL-MCNC: 1.09 MG/DL — SIGNIFICANT CHANGE UP (ref 0.5–1.3)
EGFR: 70 ML/MIN/1.73M2 — SIGNIFICANT CHANGE UP
ESTIMATED AVERAGE GLUCOSE: 111 MG/DL — SIGNIFICANT CHANGE UP (ref 68–114)
GLUCOSE SERPL-MCNC: 91 MG/DL — SIGNIFICANT CHANGE UP (ref 70–99)
HCT VFR BLD CALC: 38.5 % — LOW (ref 39–50)
HCV AB S/CO SERPL IA: 74.34 S/CO — HIGH
HCV AB SERPL-IMP: REACTIVE
HCV RNA FLD QL NAA+PROBE: SIGNIFICANT CHANGE UP
HCV RNA SPEC QL PROBE+SIG AMP: SIGNIFICANT CHANGE UP
HDLC SERPL-MCNC: 31 MG/DL — LOW
HGB BLD-MCNC: 12.7 G/DL — LOW (ref 13–17)
LIPID PNL WITH DIRECT LDL SERPL: 108 MG/DL — HIGH
MAGNESIUM SERPL-MCNC: 1.9 MG/DL — SIGNIFICANT CHANGE UP (ref 1.6–2.6)
MCHC RBC-ENTMCNC: 30.8 PG — SIGNIFICANT CHANGE UP (ref 27–34)
MCHC RBC-ENTMCNC: 33 GM/DL — SIGNIFICANT CHANGE UP (ref 32–36)
MCV RBC AUTO: 93.4 FL — SIGNIFICANT CHANGE UP (ref 80–100)
NON HDL CHOLESTEROL: 124 MG/DL — SIGNIFICANT CHANGE UP
NRBC # BLD: 0 /100 WBCS — SIGNIFICANT CHANGE UP (ref 0–0)
PLATELET # BLD AUTO: 206 K/UL — SIGNIFICANT CHANGE UP (ref 150–400)
POTASSIUM SERPL-MCNC: 4.6 MMOL/L — SIGNIFICANT CHANGE UP (ref 3.5–5.3)
POTASSIUM SERPL-SCNC: 4.6 MMOL/L — SIGNIFICANT CHANGE UP (ref 3.5–5.3)
RBC # BLD: 4.12 M/UL — LOW (ref 4.2–5.8)
RBC # FLD: 13.3 % — SIGNIFICANT CHANGE UP (ref 10.3–14.5)
SODIUM SERPL-SCNC: 138 MMOL/L — SIGNIFICANT CHANGE UP (ref 135–145)
TRIGL SERPL-MCNC: 80 MG/DL — SIGNIFICANT CHANGE UP
TSH SERPL-MCNC: 1.99 UIU/ML — SIGNIFICANT CHANGE UP (ref 0.27–4.2)
WBC # BLD: 6.42 K/UL — SIGNIFICANT CHANGE UP (ref 3.8–10.5)
WBC # FLD AUTO: 6.42 K/UL — SIGNIFICANT CHANGE UP (ref 3.8–10.5)

## 2024-03-05 PROCEDURE — 76376 3D RENDER W/INTRP POSTPROCES: CPT | Mod: 26

## 2024-03-05 PROCEDURE — 92960 CARDIOVERSION ELECTRIC EXT: CPT

## 2024-03-05 PROCEDURE — 93312 ECHO TRANSESOPHAGEAL: CPT | Mod: 26

## 2024-03-05 PROCEDURE — 93306 TTE W/DOPPLER COMPLETE: CPT | Mod: 26

## 2024-03-05 RX ORDER — LOSARTAN POTASSIUM 100 MG/1
25 TABLET, FILM COATED ORAL DAILY
Refills: 0 | Status: DISCONTINUED | OUTPATIENT
Start: 2024-03-05 | End: 2024-03-05

## 2024-03-05 RX ORDER — MAGNESIUM OXIDE 400 MG ORAL TABLET 241.3 MG
400 TABLET ORAL ONCE
Refills: 0 | Status: COMPLETED | OUTPATIENT
Start: 2024-03-05 | End: 2024-03-05

## 2024-03-05 RX ORDER — SACUBITRIL AND VALSARTAN 24; 26 MG/1; MG/1
1 TABLET, FILM COATED ORAL
Refills: 0 | Status: DISCONTINUED | OUTPATIENT
Start: 2024-03-05 | End: 2024-03-07

## 2024-03-05 RX ORDER — METOPROLOL TARTRATE 50 MG
12.5 TABLET ORAL
Refills: 0 | Status: DISCONTINUED | OUTPATIENT
Start: 2024-03-06 | End: 2024-03-07

## 2024-03-05 RX ADMIN — Medication 25 MILLIGRAM(S): at 06:07

## 2024-03-05 RX ADMIN — APIXABAN 5 MILLIGRAM(S): 2.5 TABLET, FILM COATED ORAL at 06:07

## 2024-03-05 RX ADMIN — APIXABAN 5 MILLIGRAM(S): 2.5 TABLET, FILM COATED ORAL at 17:51

## 2024-03-05 RX ADMIN — PANTOPRAZOLE SODIUM 40 MILLIGRAM(S): 20 TABLET, DELAYED RELEASE ORAL at 06:07

## 2024-03-05 NOTE — PROGRESS NOTE ADULT - SUBJECTIVE AND OBJECTIVE BOX
Cardiology PA Adult Progress Note    SUBJECTIVE ASSESSMENT:  	  MEDICATIONS:  metoprolol tartrate 25 milliGRAM(s) Oral three times a day          pantoprazole    Tablet 40 milliGRAM(s) Oral before breakfast      apixaban 5 milliGRAM(s) Oral every 12 hours  magnesium oxide 400 milliGRAM(s) Oral once    	  VITAL SIGNS:  T(C): 36.4 (03-05-24 @ 09:38), Max: 36.7 (03-04-24 @ 20:21)  HR: 101 (03-05-24 @ 08:30) (91 - 161)  BP: 133/95 (03-05-24 @ 08:30) (109/74 - 135/101)  RR: 18 (03-05-24 @ 08:30) (16 - 23)  SpO2: 95% (03-05-24 @ 08:30) (93% - 99%)  Wt(kg): --    I&O's Summary    04 Mar 2024 07:01  -  05 Mar 2024 07:00  --------------------------------------------------------  IN: 0 mL / OUT: 100 mL / NET: -100 mL      Height (cm): 170.2 (03-04 @ 21:34)  Weight (kg): 70.2 (03-04 @ 21:34)  BMI (kg/m2): 24.2 (03-04 @ 21:34)  BSA (m2): 1.81 (03-04 @ 21:34)                                     PHYSICAL EXAM:  Appearance: Normal	  HEENT: Normal oral mucosa, PERRL, EOMI	  Neck: Supple, + JVD/ - JVD; ___ Carotid Bruit   Cardiovascular: Normal S1 S2, No murmurs  Respiratory: Lungs clear to auscultation/Decreased Breath Sounds/No Rales, Rhonchi, Wheezing	  Gastrointestinal:  Soft, Non-tender, + BS	  Skin: No rashes, No ecchymoses, No cyanosis  Extremities: Normal range of motion, No clubbing, cyanosis or edema  Vascular: Peripheral pulses palpable 2+ bilaterally  Neurologic: Non-focal  Psychiatry: A & O x 3, Mood & affect appropriate    LABS:	 	                                  12.7   6.42  )-----------( 206      ( 05 Mar 2024 05:30 )             38.5     03-05    138  |  108  |  22  ----------------------------<  91  4.6   |  20<L>  |  1.09    Ca    9.1      05 Mar 2024 05:30  Mg     1.9     03-05    TPro  7.2  /  Alb  4.2  /  TBili  0.8  /  DBili  x   /  AST  22  /  ALT  18  /  AlkPhos  82  03-04    proBNP:   Lipid Profile:   HgA1c:   TSH: Thyroid Stimulating Hormone, Serum: 1.990 uIU/mL (03-05 @ 05:30)    PT/INR - ( 04 Mar 2024 14:22 )   PT: 11.9 sec;   INR: 1.05          PTT - ( 04 Mar 2024 14:22 )  PTT:28.1 sec Cardiology PA Adult Progress Note    SUBJECTIVE ASSESSMENT: Seen and examined at bedside. Denies SOB currently. Pt does endorse occasional palpitations. Overall slept well.  	  MEDICATIONS:  metoprolol tartrate 25 milliGRAM(s) Oral three times a day  pantoprazole    Tablet 40 milliGRAM(s) Oral before breakfast  apixaban 5 milliGRAM(s) Oral every 12 hours  magnesium oxide 400 milliGRAM(s) Oral once    VITAL SIGNS:  T(C): 36.4 (03-05-24 @ 09:38), Max: 36.7 (03-04-24 @ 20:21)  HR: 101 (03-05-24 @ 08:30) (91 - 161)  BP: 133/95 (03-05-24 @ 08:30) (109/74 - 135/101)  RR: 18 (03-05-24 @ 08:30) (16 - 23)  SpO2: 95% (03-05-24 @ 08:30) (93% - 99%)  Wt(kg): --    I&O's Summary    04 Mar 2024 07:01  -  05 Mar 2024 07:00  --------------------------------------------------------  IN: 0 mL / OUT: 100 mL / NET: -100 mL    Height (cm): 170.2 (03-04 @ 21:34)  Weight (kg): 70.2 (03-04 @ 21:34)  BMI (kg/m2): 24.2 (03-04 @ 21:34)  BSA (m2): 1.81 (03-04 @ 21:34)                                     PHYSICAL EXAM:  Appearance: Normal	  HEENT: Normal oral mucosa, PERRL, EOMI	  Neck: Supple, -JVP, No Carotid Bruit   Cardiovascular: Normal S1 S2, No murmurs  Respiratory: Lungs clear to auscultation  Gastrointestinal:  Soft, Non-tender, + BS	  Skin: No rashes, No ecchymoses, No cyanosis  Extremities: Normal range of motion, No clubbing, cyanosis or edema  Vascular: Peripheral pulses palpable 2+ bilaterally  Neurologic: Non-focal  Psychiatry: A & O x 3, Mood & affect appropriate    LABS:	 	                        12.7   6.42  )-----------( 206      ( 05 Mar 2024 05:30 )             38.5     03-05    138  |  108  |  22  ----------------------------<  91  4.6   |  20<L>  |  1.09    Ca    9.1      05 Mar 2024 05:30  Mg     1.9     03-05    TPro  7.2  /  Alb  4.2  /  TBili  0.8  /  DBili  x   /  AST  22  /  ALT  18  /  AlkPhos  82  03-04    TSH: Thyroid Stimulating Hormone, Serum: 1.990 uIU/mL (03-05 @ 05:30)    PT/INR - ( 04 Mar 2024 14:22 )   PT: 11.9 sec;   INR: 1.05        PTT - ( 04 Mar 2024 14:22 )  PTT:28.1 sec

## 2024-03-05 NOTE — PROGRESS NOTE ADULT - PROBLEM SELECTOR PLAN 2
Clinically euvolemic with warm extremities. spO2 99% RA, Lungs clear, no edema  -Diuretic: None  -Etiology: Likely Tachy-induced CM  -Strict I&Os, daily weights, fluid restriction, core measures  -TTE 3/5/24: LVEF 19%, severely reduced LVSF with global hypokinesis, mod dilated LV size, mod reduced RVSF, mild biatrial enlargement, mod functional MR.   -GDMT: Toprol as above. Initiate losartan 25 mg QD.   -Plan: NPO after MN for CCTA to r/o ischemic heart disease    F: NONE  D: DASH diet. NPO after MN  Lytes keep K >4, Mg >2  DVT ppx: Eliquis    Dispo: Pending clinical progression

## 2024-03-05 NOTE — PROGRESS NOTE ADULT - ASSESSMENT
imp - afib , likely x 1 mos - pt didnt call to report symptoms,   now w cm, likely rate related,   for bucky/dccv today  will need chf rx - entresto/jardiance/bblocker and a/c  will need ablation for afib given severe decompensatoin in setting of afib.     will decide after dccv med regimen  can consider ischemic w/u - CTA = r/o cad, although unlikly w neg trop and elevated hr x hours/days...    indy wilkinson md   c [phone]

## 2024-03-05 NOTE — PROGRESS NOTE ADULT - ASSESSMENT
77 y/o M w/ PMHx of HTN, PAF (not on A/C), prostate CA (s/p prostatectomy), macular degeneration (s/p recent operation), Hep C (s/p tx), sent to ED from cardiology office for rapid afib and new onset CHF (outpatient echo showing EF of 10%). Admitted to cardiac telemetry with plan for KATHY/DCCV with Dr. Oliva 3/5/24.    79 y/o M w/ PMHx of HTN, PAF (not on A/C), prostate CA (s/p prostatectomy), macular degeneration, Hep C (s/p tx), sent to ED from cardiology office for rapid afib and new onset CHF (outpatient echo showing EF of 10%). Now s/p KATHY/DCCV. TTE reveals EF 19%, optimizing GDMT. Plan for CCTA r/o CAD, NPO after MN.

## 2024-03-05 NOTE — PROGRESS NOTE ADULT - SUBJECTIVE AND OBJECTIVE BOX
JOE MACK  MRN-[id number]  3-5-24    Pt w h/o htn and pafib presents yesterday to office w 1 mos sob, off/on palps  in afib 160 bpm, echo ef 10%,   was not on a/c  other pmh =- hep c - treated, retinal vein occlusion    Vital Signs Last 24 Hrs  T(C): 36.4 (03-05-24 @ 05:17), Max: 36.7 (03-04-24 @ 20:21)  T(F): 97.5 (03-05-24 @ 05:17), Max: 98 (03-04-24 @ 20:21)  HR: 101 (03-05-24 @ 08:30) (91 - 161)  BP: 133/95 (03-05-24 @ 08:30) (109/74 - 135/101)  BP(mean): 110 (03-05-24 @ 08:30) (89 - 110)  RR: 18 (03-05-24 @ 08:30) (16 - 23)  SpO2: 95% (03-05-24 @ 08:30) (93% - 99%)      pe    cta  ireg  no edema                          12.7   6.42  )-----------( 206      ( 05 Mar 2024 05:30 )             38.5     03-05    138  |  108  |  22  ----------------------------<  91  4.6   |  20<L>  |  1.09    Ca    9.1      05 Mar 2024 05:30  Mg     1.9     03-05    TPro  7.2  /  Alb  4.2  /  TBili  0.8  /  DBili  x   /  AST  22  /  ALT  18  /  AlkPhos  82  03-04    tsh nl,   bnp high    MEDICATIONS  (STANDING):  apixaban 5 milliGRAM(s) Oral every 12 hours  magnesium oxide 400 milliGRAM(s) Oral once  metoprolol tartrate 25 milliGRAM(s) Oral three times a day  pantoprazole    Tablet 40 milliGRAM(s) Oral before breakfast    MEDICATIONS  (PRN):

## 2024-03-06 ENCOUNTER — TRANSCRIPTION ENCOUNTER (OUTPATIENT)
Age: 79
End: 2024-03-06

## 2024-03-06 LAB
ALBUMIN SERPL ELPH-MCNC: 4.1 G/DL — SIGNIFICANT CHANGE UP (ref 3.3–5)
ALP SERPL-CCNC: 79 U/L — SIGNIFICANT CHANGE UP (ref 40–120)
ALT FLD-CCNC: 19 U/L — SIGNIFICANT CHANGE UP (ref 10–45)
ANION GAP SERPL CALC-SCNC: 8 MMOL/L — SIGNIFICANT CHANGE UP (ref 5–17)
AST SERPL-CCNC: 30 U/L — SIGNIFICANT CHANGE UP (ref 10–40)
BILIRUB SERPL-MCNC: 0.6 MG/DL — SIGNIFICANT CHANGE UP (ref 0.2–1.2)
BUN SERPL-MCNC: 29 MG/DL — HIGH (ref 7–23)
CALCIUM SERPL-MCNC: 9 MG/DL — SIGNIFICANT CHANGE UP (ref 8.4–10.5)
CHLORIDE SERPL-SCNC: 106 MMOL/L — SIGNIFICANT CHANGE UP (ref 96–108)
CO2 SERPL-SCNC: 22 MMOL/L — SIGNIFICANT CHANGE UP (ref 22–31)
CREAT SERPL-MCNC: 1.12 MG/DL — SIGNIFICANT CHANGE UP (ref 0.5–1.3)
EGFR: 68 ML/MIN/1.73M2 — SIGNIFICANT CHANGE UP
GLUCOSE SERPL-MCNC: 101 MG/DL — HIGH (ref 70–99)
HCT VFR BLD CALC: 40.4 % — SIGNIFICANT CHANGE UP (ref 39–50)
HGB BLD-MCNC: 13.1 G/DL — SIGNIFICANT CHANGE UP (ref 13–17)
MAGNESIUM SERPL-MCNC: 2 MG/DL — SIGNIFICANT CHANGE UP (ref 1.6–2.6)
MCHC RBC-ENTMCNC: 30.8 PG — SIGNIFICANT CHANGE UP (ref 27–34)
MCHC RBC-ENTMCNC: 32.4 GM/DL — SIGNIFICANT CHANGE UP (ref 32–36)
MCV RBC AUTO: 95.1 FL — SIGNIFICANT CHANGE UP (ref 80–100)
NRBC # BLD: 0 /100 WBCS — SIGNIFICANT CHANGE UP (ref 0–0)
PLATELET # BLD AUTO: 169 K/UL — SIGNIFICANT CHANGE UP (ref 150–400)
POTASSIUM SERPL-MCNC: 4.7 MMOL/L — SIGNIFICANT CHANGE UP (ref 3.5–5.3)
POTASSIUM SERPL-SCNC: 4.7 MMOL/L — SIGNIFICANT CHANGE UP (ref 3.5–5.3)
PROT SERPL-MCNC: 7.2 G/DL — SIGNIFICANT CHANGE UP (ref 6–8.3)
RBC # BLD: 4.25 M/UL — SIGNIFICANT CHANGE UP (ref 4.2–5.8)
RBC # FLD: 13.2 % — SIGNIFICANT CHANGE UP (ref 10.3–14.5)
SODIUM SERPL-SCNC: 136 MMOL/L — SIGNIFICANT CHANGE UP (ref 135–145)
WBC # BLD: 7.18 K/UL — SIGNIFICANT CHANGE UP (ref 3.8–10.5)
WBC # FLD AUTO: 7.18 K/UL — SIGNIFICANT CHANGE UP (ref 3.8–10.5)

## 2024-03-06 PROCEDURE — 75574 CT ANGIO HRT W/3D IMAGE: CPT | Mod: 26

## 2024-03-06 RX ORDER — DAPAGLIFLOZIN 10 MG/1
1 TABLET, FILM COATED ORAL
Qty: 30 | Refills: 0
Start: 2024-03-06 | End: 2024-04-04

## 2024-03-06 RX ORDER — AMIODARONE HYDROCHLORIDE 400 MG/1
TABLET ORAL
Refills: 0 | Status: DISCONTINUED | OUTPATIENT
Start: 2024-03-06 | End: 2024-03-07

## 2024-03-06 RX ORDER — ZALEPLON 10 MG
2.5 CAPSULE ORAL ONCE
Refills: 0 | Status: DISCONTINUED | OUTPATIENT
Start: 2024-03-06 | End: 2024-03-06

## 2024-03-06 RX ORDER — APIXABAN 2.5 MG/1
1 TABLET, FILM COATED ORAL
Qty: 60 | Refills: 0
Start: 2024-03-06 | End: 2024-04-04

## 2024-03-06 RX ORDER — AMIODARONE HYDROCHLORIDE 400 MG/1
400 TABLET ORAL EVERY 12 HOURS
Refills: 0 | Status: DISCONTINUED | OUTPATIENT
Start: 2024-03-06 | End: 2024-03-07

## 2024-03-06 RX ORDER — SACUBITRIL AND VALSARTAN 24; 26 MG/1; MG/1
1 TABLET, FILM COATED ORAL
Qty: 60 | Refills: 0
Start: 2024-03-06 | End: 2024-04-04

## 2024-03-06 RX ORDER — AMIODARONE HYDROCHLORIDE 400 MG/1
200 TABLET ORAL DAILY
Refills: 0 | Status: CANCELLED | OUTPATIENT
Start: 2024-03-13 | End: 2024-03-07

## 2024-03-06 RX ORDER — LANOLIN ALCOHOL/MO/W.PET/CERES
5 CREAM (GRAM) TOPICAL ONCE
Refills: 0 | Status: DISCONTINUED | OUTPATIENT
Start: 2024-03-06 | End: 2024-03-07

## 2024-03-06 RX ORDER — AMIODARONE HYDROCHLORIDE 400 MG/1
150 TABLET ORAL ONCE
Refills: 0 | Status: COMPLETED | OUTPATIENT
Start: 2024-03-06 | End: 2024-03-06

## 2024-03-06 RX ADMIN — SACUBITRIL AND VALSARTAN 1 TABLET(S): 24; 26 TABLET, FILM COATED ORAL at 17:52

## 2024-03-06 RX ADMIN — PANTOPRAZOLE SODIUM 40 MILLIGRAM(S): 20 TABLET, DELAYED RELEASE ORAL at 06:46

## 2024-03-06 RX ADMIN — Medication 12.5 MILLIGRAM(S): at 06:46

## 2024-03-06 RX ADMIN — APIXABAN 5 MILLIGRAM(S): 2.5 TABLET, FILM COATED ORAL at 06:45

## 2024-03-06 RX ADMIN — AMIODARONE HYDROCHLORIDE 400 MILLIGRAM(S): 400 TABLET ORAL at 10:23

## 2024-03-06 RX ADMIN — APIXABAN 5 MILLIGRAM(S): 2.5 TABLET, FILM COATED ORAL at 17:53

## 2024-03-06 RX ADMIN — AMIODARONE HYDROCHLORIDE 400 MILLIGRAM(S): 400 TABLET ORAL at 19:18

## 2024-03-06 RX ADMIN — AMIODARONE HYDROCHLORIDE 600 MILLIGRAM(S): 400 TABLET ORAL at 19:18

## 2024-03-06 RX ADMIN — SACUBITRIL AND VALSARTAN 1 TABLET(S): 24; 26 TABLET, FILM COATED ORAL at 06:46

## 2024-03-06 RX ADMIN — Medication 12.5 MILLIGRAM(S): at 17:52

## 2024-03-06 NOTE — PROGRESS NOTE ADULT - SUBJECTIVE AND OBJECTIVE BOX
Cardiology PA Adult Progress Note    Subjective Assessment: Patient seen and examined at bedside.   	  MEDICATIONS:  metoprolol succinate ER 12.5 milliGRAM(s) Oral two times a day  sacubitril 24 mG/valsartan 26 mG 1 Tablet(s) Oral two times a day      melatonin 5 milliGRAM(s) Oral once PRN    pantoprazole    Tablet 40 milliGRAM(s) Oral before breakfast      apixaban 5 milliGRAM(s) Oral every 12 hours        [PHYSICAL EXAM:  TELEMETRY:  T(C): 36.6 (03-06-24 @ 09:29), Max: 36.9 (03-05-24 @ 22:13)  HR: 65 (03-06-24 @ 08:33) (52 - 76)  BP: 121/77 (03-06-24 @ 08:33) (82/66 - 125/87)  RR: 18 (03-06-24 @ 08:33) (18 - 23)  SpO2: 95% (03-06-24 @ 08:33) (95% - 97%)  Wt(kg): --  I&O's Summary          	    LABS:	 	  CARDIAC MARKERS:                            13.1   7.18  )-----------( 169      ( 06 Mar 2024 05:30 )             40.4     03-06    136  |  106  |  29<H>  ----------------------------<  101<H>  4.7   |  22  |  1.12    Ca    9.0      06 Mar 2024 05:30  Mg     2.0     03-06    TPro  7.2  /  Alb  4.1  /  TBili  0.6  /  DBili  x   /  AST  30  /  ALT  19  /  AlkPhos  79  03-06    proBNP:   Lipid Profile:   HgA1c:   TSH:   PT/INR - ( 04 Mar 2024 14:22 )   PT: 11.9 sec;   INR: 1.05          PTT - ( 04 Mar 2024 14:22 )  PTT:28.1 sec

## 2024-03-06 NOTE — PROGRESS NOTE ADULT - SUBJECTIVE AND OBJECTIVE BOX
Interventional Cardiology PA Adult Progress Note    Subjective Assessment: Patient seen and examined at bedside. Pt feels well. specifically denies SOB, CP, palps, F/c, NVD.    EVENTS: at time of pending discharge, pt went into Rapid Afib on Telemetry. Pt asymptomatic. Discharge canceled and plan as below.   	  MEDICATIONS:  aMIOdarone    Tablet 400 milliGRAM(s) Oral every 12 hours  aMIOdarone    Tablet   Oral   aMIOdarone IVPB 150 milliGRAM(s) IV Intermittent once  metoprolol succinate ER 12.5 milliGRAM(s) Oral two times a day  sacubitril 24 mG/valsartan 26 mG 1 Tablet(s) Oral two times a day    melatonin 5 milliGRAM(s) Oral once PRN    pantoprazole    Tablet 40 milliGRAM(s) Oral before breakfast    apixaban 5 milliGRAM(s) Oral every 12 hours        [PHYSICAL EXAM:  TELEMETRY:  T(C): 36.8 (03-06-24 @ 18:13), Max: 36.9 (03-05-24 @ 22:13)  HR: 67 (03-06-24 @ 17:48) (57 - 76)  BP: 100/65 (03-06-24 @ 17:48) (91/64 - 125/87)  RR: 20 (03-06-24 @ 17:48) (18 - 23)  SpO2: 95% (03-06-24 @ 17:48) (95% - 97%)  Wt(kg): --  I&O's Summary                                       Appearance: Pt seen in bed in NAD 	  HEENT:   Normal oral mucosa, PERRL, EOMI	  Neck: Supple  Cardiovascular: Normal S1 S2, No JVD, No murmurs  Respiratory: Lungs clear to auscultation b/l	  Gastrointestinal:  Soft, Non-tender, + BS	  Skin: No rashes, No ecchymoses, No cyanosis  Extremities: Normal range of motion, No clubbing, cyanosis or edema  Vascular: Peripheral pulses palpable 2+ bilaterally  Neurologic: Non-focal  Psychiatry: A & O x 3, Mood & affect appropriate  	    LABS:	 	  CARDIAC MARKERS:                                  13.1   7.18  )-----------( 169      ( 06 Mar 2024 05:30 )             40.4     03-06    136  |  106  |  29<H>  ----------------------------<  101<H>  4.7   |  22  |  1.12    Ca    9.0      06 Mar 2024 05:30  Mg     2.0     03-06    TPro  7.2  /  Alb  4.1  /  TBili  0.6  /  DBili  x   /  AST  30  /  ALT  19  /  AlkPhos  79  03-06    proBNP:   Lipid Profile:   HgA1c:   TSH:

## 2024-03-06 NOTE — PROGRESS NOTE ADULT - ASSESSMENT
79 y/o M w/ PMHx of HTN, PAF (not on A/C), prostate CA (s/p prostatectomy), macular degeneration, Hep C (s/p tx), sent to ED from cardiology office for rapid afib and new onset CHF (outpatient echo showing EF of 10%). S/p KATHY/DCCV on 3/5. TTE reveals EF 19%, optimizing GDMT.  CCTA with only RPL and Ramus CAD.  Pt was pending 3/6/24 discharge to home however pt went back into Rapid Afib.

## 2024-03-06 NOTE — DISCHARGE NOTE PROVIDER - NSDCCPCAREPLAN_GEN_ALL_CORE_FT
PRINCIPAL DISCHARGE DIAGNOSIS  Diagnosis: Atrial fibrillation with RVR  Assessment and Plan of Treatment: You have an abnormal heart rhythm (arrhythmia) called atrial fibrillation. With this condition, the hearts 2 upper chambers (the atria) quiver rather than squeeze the blood out in a normal pattern. This leads to an irregular and sometimes rapid heartbeat. Atrial fibrillation is serious condition as it affects the heart’s ability to fill with blood as it should and blood clots may form, which increases the risk for stroke.  You underwent a KATHY to ensure you did not have a clot and then had cardioversion successfully so you are now in a normal rhythm.  -Please CONTINUE ELIQUIS 5MG TWICE A DAY to prevent a stroke.  -Please CONTINUE Toprol XL 12.5 mg twice daily to keep your heart rate regular  -Please CONTINUE AMIODARONE 200 MG DAILY to keep your heart in normal rhythm  -Please follow up with Dr. Oliva in 1 week.        SECONDARY DISCHARGE DIAGNOSES  Diagnosis: Acute systolic congestive heart failure  Assessment and Plan of Treatment: You have a weak heart, also known as Congestive Heart Failure (CHF). Heart failure is a condition in which the heart does not pump or fill with blood well. As a result, the heart lags behind in its job of moving blood throughout the body. This can lead to symptoms such as swelling, trouble breathing, and feeling tired. Your Ejection Fraction (EF) is 19%,  a normal EF is 55-60%.  Your heart is weak most likely due to your heart rate being elevated for a long period of time. You must follow up with your cardiologist to recheck your heart function after a few months on being on the medications that should help improve it.   You underwent a cardiac CT scan revealing _________. This was to ensure you did not have any blockages in your heart that was causing your heart function to be low.   -Please continue Entresto 24-26 mg twice daily, Toprol XL 12.5 mg twice daily and ________ to help strenghten your heart muscle  -Avoid drinking more than 1.5L of fluid daily and maintain a low salt diet (max 2grams daily).  -Please weigh yourself daily, for any significant increases in daily weight of 2lbs/day or 5lbs/week with associated swelling in the legs or abdomen and/or shortness of breath, please call your doctor or go to the emergency room.  -Follow up with Dr. Oliva  in 1 week.       PRINCIPAL DISCHARGE DIAGNOSIS  Diagnosis: Atrial fibrillation with RVR  Assessment and Plan of Treatment: You have an abnormal heart rhythm (arrhythmia) called atrial fibrillation. With this condition, the hearts 2 upper chambers (the atria) quiver rather than squeeze the blood out in a normal pattern. This leads to an irregular and sometimes rapid heartbeat. Atrial fibrillation is serious condition as it affects the heart’s ability to fill with blood as it should and blood clots may form, which increases the risk for stroke.  You underwent a KATHY to ensure you did not have a clot and then had cardioversion successfully so you are now in a normal rhythm.  -Please CONTINUE ELIQUIS 5MG TWICE A DAY to prevent a stroke.  -Please CONTINUE Toprol XL 25 mg twice daily to keep your heart rate regular  -Please CONTINUE AMIODARONE 400 till 3/13 then start amiodarone 200 MG DAILY on 3/14 to keep your heart in normal rhythm  -Please follow up with Dr. Oliva in 2-3 weeks.        SECONDARY DISCHARGE DIAGNOSES  Diagnosis: Acute systolic congestive heart failure  Assessment and Plan of Treatment: You have a weak heart, also known as Congestive Heart Failure (CHF). Heart failure is a condition in which the heart does not pump or fill with blood well. As a result, the heart lags behind in its job of moving blood throughout the body. This can lead to symptoms such as swelling, trouble breathing, and feeling tired. Your Ejection Fraction (EF) is 19%,  a normal EF is 55-60%.  Your heart is weak most likely due to your heart rate being elevated for a long period of time. You must follow up with your cardiologist to recheck your heart function after a few months on being on the medications that should help improve it.   -Please continue Entresto 24-26 mg twice daily, Toprol XL 25 mg twice daily and spironolactone 12.5 daily to help strenghten your heart muscle  -Avoid drinking more than 1.5L of fluid daily and maintain a low salt diet (max 2grams daily).  -Please weigh yourself daily, for any significant increases in daily weight of 2lbs/day or 5lbs/week with associated swelling in the legs or abdomen and/or shortness of breath, please call your doctor or go to the emergency room.  -Follow up with Dr. Oliva  in 2-3 weeks.

## 2024-03-06 NOTE — DISCHARGE NOTE PROVIDER - CARE PROVIDER_API CALL
Julián Oliva  Cardiac Electrophysiology  [address] [address]  Phone: [phone]  Fax: [phone]  Established Patient  Follow Up Time: 1 week

## 2024-03-06 NOTE — PROGRESS NOTE ADULT - PROBLEM SELECTOR PLAN 1
Initially, rapid AF w/ HR in 130-150 bpm.   -Pt initially converted to Sinus rhythm s/p KATHY/DCCV on 3/5/24, then at time of discharge on 3/6/24 at ~7pm on Telemetry was found to return to Rapid AFib 120s-140s.   -Antiarrythmic: STAT 150mg IVPB Amio x1; C/w PO Amio load of 400mg BID x7 days per EP (started 3/6/24 AM) then 200mg qd following   -Rate control: C/w Toprol 12.5 mg BID   -AC: c/w Eliquis 5 mg BID (CHADSVASC 3)  -c/w close telemetry monitoring

## 2024-03-06 NOTE — PROGRESS NOTE ADULT - PROBLEM SELECTOR PLAN 2
Clinically euvolemic with warm extremities. spO2 99% RA, Lungs clear, no edema  -Diuretic: None  -Etiology: Likely Tachy-induced CM  -Strict I&Os, daily weights, fluid restriction, core measures  -TTE 3/5/24: LVEF 19%, severely reduced LVSF with global hypokinesis, mod dilated LV size, mod reduced RVSF, mild biatrial enlargement, mod functional MR.   -GDMT: Toprol as above; Entresto 24-26 BID, Farxiga 10mg qd  -CCTA to r/o ischemic heart disease 3/6: preliminary report minimally revealing (only CAD to Ramus, RPL), and pt was preparing for discharge, however reverted back into rapid afib as detailed above.    F: NONE  D: DASH diet.  Keep NPO-mn in case of any EP intervention   Lytes keep K >4, Mg >2  DVT ppx: Eliquis  Dispo: Pending clinical progression  Case discussed with Dr. Oliva.

## 2024-03-06 NOTE — DISCHARGE NOTE PROVIDER - HOSPITAL COURSE
77 yo M w/ PMHx of HTN, paroxysmal afib (not on A/C), prostate cancer (s/p prostatectomy), macular degeneration (s/p recent operation), Hep C (s/p tx), was sent to the ED by his cardiologist Dr. Oliva for afib with RVR and new onset CHF (outpatient echo showing EF of 10%). Pt admits to worsening SOB since July. Dyspnea exacerbated when walking on incline, but he can walk on level ground ~1 mile without symptoms. Pt also endorses intermittent palpitations but states this has been an ongoing issue for many years. He was diagnosed with AFib in 2018 (echo showing EF of 45-50% at this time) and started on eliquis which was discontinued. Pt says he had an unknown viral illness ~2 weeks ago with fevers that has since resolved. He also admits to recent weight loss. In ED, patient was found to be in AFib with RVR with HR in the 130-140s. Labs remarkable for a BNP of 9229, HsTnT 16. Patient was given Lopressor 25mg PO x1 and Lopressor 5mg IVP. Pt was started on Eliquis 5mg PO BID. Patient admitted to cardiac telemetry for rapid afib  management.    During hospital course, pt underwent TTE 3/5/24 revealing LVEF 19%, severely reduced LVSF with global hypokinesis, mod dilated LV size, mod reduced RVSF, mild biatrial enlargement, mod functional MR. Pt underwent successful KATHY/DCCV on 3/5/24, now in sinus rhythm. To maintain sinus rhythm, pt was loaded on amiodarone therapy and continued on maintenance therapy. Pt was initiated on GDMT including Toprol XL 12.5 mg BID, entresto 24-26 mg BID, _________SGLT2i or MRA?    Given newly reduced LVEF, pt underwent CCTA to rule out ischemic cardiomyopathy on 3/6 revealing _______________.     Pt is asymptomatic at this time and denies chest pain, SOB, ANN, palpitations, dizziness, LOC, N/V, diaphoresis, orthopnea/PND, and leg swelling. Pt able to ambulate and void without complication. VSS. Labs and telemetry reviewed. Pt is a candidate for discharge per Dr. Oliva. Pt given appropriate discharge instructions, pt states they have an appropriate amount of their previous home meds unchanged from this visit at home, and any new medications were sent to their pharmacy. Pt instructed to f/u with ________ in 1-2 weeks.    Discharge meds: Eliquis 5 mg BID, Toprol XL 12.5 mg BID, Entresto 24-26 mg BID, Amiodarone ____   79 yo M w/ PMHx of HTN, paroxysmal afib (not on A/C), prostate cancer (s/p prostatectomy), macular degeneration (s/p recent operation), Hep C (s/p tx), was sent to the ED by his cardiologist Dr. Oliva for afib with RVR and new onset CHF (outpatient echo showing EF of 10%). Pt admits to worsening SOB since July. Dyspnea exacerbated when walking on incline, but he can walk on level ground ~1 mile without symptoms. Pt also endorses intermittent palpitations but states this has been an ongoing issue for many years. He was diagnosed with AFib in 2018 (echo showing EF of 45-50% at this time) and started on eliquis which was discontinued. Pt says he had an unknown viral illness ~2 weeks ago with fevers that has since resolved. He also admits to recent weight loss. In ED, patient was found to be in AFib with RVR with HR in the 130-140s. Labs remarkable for a BNP of 9229, HsTnT 16. Patient was given Lopressor 25mg PO x1 and Lopressor 5mg IVP. Pt was started on Eliquis 5mg PO BID. Patient admitted to cardiac telemetry for rapid afib management.    During hospital course, pt underwent TTE 3/5/24 revealing LVEF 19%, severely reduced LVSF with global hypokinesis, mod dilated LV size, mod reduced RVSF, mild biatrial enlargement, mod functional MR. Clinically patient euvolemic and did not appear acutely decompensated on exam, therefore lasix therapy was not initiated. Pt underwent successful KATHY/DCCV on 3/5/24, now in sinus rhythm. To maintain sinus rhythm, pt was loaded on amiodarone therapy and continued on maintenance therapy. Pt was initiated on GDMT including Toprol XL 12.5 mg BID, entresto 24-26 mg BID, _________SGLT2i or MRA?    Given newly reduced LVEF, pt underwent CCTA to rule out ischemic cardiomyopathy on 3/6 revealing _______________.     Pt is asymptomatic at this time and denies chest pain, SOB, ANN, palpitations, dizziness, LOC, N/V, diaphoresis, orthopnea/PND, and leg swelling. Pt able to ambulate and void without complication. VSS. Labs and telemetry reviewed. Pt is a candidate for discharge per Dr. Oliva. Pt given appropriate discharge instructions, pt states they have an appropriate amount of their previous home meds unchanged from this visit at home, and any new medications were sent to their pharmacy. Pt instructed to f/u with ________ in 1-2 weeks.    Discharge meds: Eliquis 5 mg BID, Toprol XL 12.5 mg BID, Entresto 24-26 mg BID, Amiodarone ____   77 yo M w/ PMHx of HTN, paroxysmal afib (not on A/C), prostate cancer (s/p prostatectomy), macular degeneration (s/p recent operation), Hep C (s/p tx), was sent to the ED by his cardiologist Dr. Oliva for afib with RVR and new onset CHF (outpatient echo showing EF of 10%). Pt admits to worsening SOB since July. Dyspnea exacerbated when walking on incline, but he can walk on level ground ~1 mile without symptoms. Pt also endorses intermittent palpitations but states this has been an ongoing issue for many years. He was diagnosed with AFib in 2018 (echo showing EF of 45-50% at this time) and started on eliquis which was discontinued. Pt says he had an unknown viral illness ~2 weeks ago with fevers that has since resolved. He also admits to recent weight loss. In ED, patient was found to be in AFib with RVR with HR in the 130-140s. Labs remarkable for a BNP of 9229, HsTnT 16. Patient was given Lopressor 25mg PO x1 and Lopressor 5mg IVP. Pt was started on Eliquis 5mg PO BID. Patient admitted to cardiac telemetry for rapid afib management.    During hospital course, pt underwent TTE 3/5/24 revealing LVEF 19%, severely reduced LVSF with global hypokinesis, mod dilated LV size, mod reduced RVSF, mild biatrial enlargement, mod functional MR. Clinically patient euvolemic and did not appear acutely decompensated on exam, therefore lasix therapy was not initiated. Pt underwent successful KATHY/DCCV on 3/5/24, now in sinus rhythm. To maintain sinus rhythm, pt was loaded on amiodarone therapy and continued on maintenance therapy.     Pt was initiated on GDMT including Toprol XL 12.5 mg BID, entresto 24-26 mg BID, Farxiga 10mg QD.     Given newly reduced LVEF, pt underwent CCTA to rule out ischemic cardiomyopathy on 3/6/24 revealing: not acute ischemic cardiomyopathy.  Preliminary report discussed with Dr. Oliva:   "Cardiac: 1.  The calcium score is severe at 623 Agatston units, which is at the 76 percentile, adjusted for age, gender and race. 2.  Severe stenosis of the Ramus.  3.  Probably nonobstructive distal LAD. 4.  Moderate stenosis of the RPL. 5.  Nonobstructive disease in portions of the remaining segments.    Non-cardiac:  Cardiac findings independently dictated by the Cardiology Attending.  Non-cardiac findings independently dictated by the Radiology Attending.    M.D., Attending Cardiologist  M.D., Attending Radiologist  ******PRELIMINARY REPORT******    ******PRELIMINARY REPORT****** "    As discussed with Dr. Oliva, CCTA does not portray ischemic cardiomyopathy. Pt     Pt is asymptomatic at this time and denies chest pain, SOB, ANN, palpitations, dizziness, LOC, N/V, diaphoresis, orthopnea/PND, and leg swelling. Pt able to ambulate and void without complication. VSS. Labs and telemetry reviewed. Pt is a candidate for discharge per Dr. Oliva. Pt given appropriate discharge instructions, pt states they have an appropriate amount of their previous home meds unchanged from this visit at home, and any new medications were sent to their pharmacy. Pt instructed to f/u with ________ in 1-2 weeks.    Discharge meds: Eliquis 5 mg BID, Toprol XL 12.5 mg BID, Entresto 24-26 mg BID, Amiodarone ____   77 yo M w/ PMHx of HTN, paroxysmal afib (not on A/C), prostate cancer (s/p prostatectomy), macular degeneration (s/p recent operation), Hep C (s/p tx), was sent to the ED by his cardiologist Dr. Oliva for afib with RVR and new onset CHF (outpatient echo showing EF of 10%). Pt admits to worsening SOB since July. Dyspnea exacerbated when walking on incline, but he can walk on level ground ~1 mile without symptoms. Pt also endorses intermittent palpitations but states this has been an ongoing issue for many years. He was diagnosed with AFib in 2018 (echo showing EF of 45-50% at this time) and started on eliquis which was discontinued. Pt says he had an unknown viral illness ~2 weeks ago with fevers that has since resolved. He also admits to recent weight loss. In ED, patient was found to be in AFib with RVR with HR in the 130-140s. Labs remarkable for a BNP of 9229, HsTnT 16. Patient was given Lopressor 25mg PO x1 and Lopressor 5mg IVP. Pt was started on Eliquis 5mg PO BID. Patient admitted to cardiac telemetry for rapid afib management.    During hospital course, pt underwent TTE 3/5/24 revealing LVEF 19%, severely reduced LVSF with global hypokinesis, mod dilated LV size, mod reduced RVSF, mild biatrial enlargement, mod functional MR. Clinically patient euvolemic and did not appear acutely decompensated on exam, therefore lasix therapy was not initiated. Pt underwent successful KATHY/DCCV on 3/5/24, now in sinus rhythm. To maintain sinus rhythm, pt was loaded on amiodarone therapy and continued on maintenance therapy.     Pt was initiated on GDMT including Toprol XL 12.5 mg BID, entresto 24-26 mg BID, Farxiga 10mg QD.     Given newly reduced LVEF, pt underwent CCTA to rule out ischemic cardiomyopathy on 3/6/24 revealing: not acute ischemic cardiomyopathy.  Preliminary report discussed with Dr. Oliva:   "Cardiac: 1.  The calcium score is severe at 623 Agatston units, which is at the 76 percentile, adjusted for age, gender and race. 2.  Severe stenosis of the Ramus.  3.  Probably nonobstructive distal LAD. 4.  Moderate stenosis of the RPL. 5.  Nonobstructive disease in portions of the remaining segments.    Non-cardiac:  Cardiac findings independently dictated by the Cardiology Attending.  Non-cardiac findings independently dictated by the Radiology Attending.    M.D., Attending Cardiologist  M.D., Attending Radiologist  ******PRELIMINARY REPORT******    ******PRELIMINARY REPORT****** "    As discussed with Dr. Oliva, CCTA does not portray ischemic cardiomyopathy.     Course c/b pt reentering Efib w/ RVR on 3/6. Pt underwent successful DCCV on 3/7.    Pt is asymptomatic at this time and denies chest pain, SOB, ANN, palpitations, dizziness, LOC, N/V, diaphoresis, orthopnea/PND, and leg swelling. Pt able to ambulate and void without complication. VSS. Labs and telemetry reviewed. Pt is a candidate for discharge per Dr. Oliva. Pt given appropriate discharge instructions, pt states they have an appropriate amount of their previous home meds unchanged from this visit at home, and any new medications were sent to their pharmacy. Pt instructed to f/u with Hazel in 1-2 weeks.    Discharge meds: Eliquis 5 mg BID, Toprol XL 12.5 mg BID, Entresto 24-26 mg BID, Amiodarone ____, spironolactone 12.5mg qd   77 yo M w/ PMHx of HTN, paroxysmal afib (not on A/C), prostate cancer (s/p prostatectomy), macular degeneration (s/p recent operation), Hep C (s/p tx), was sent to the ED by his cardiologist Dr. Oliva for afib with RVR and new onset CHF (outpatient echo showing EF of 10%). Pt admits to worsening SOB since July. Dyspnea exacerbated when walking on incline, but he can walk on level ground ~1 mile without symptoms. Pt also endorses intermittent palpitations but states this has been an ongoing issue for many years. He was diagnosed with AFib in 2018 (echo showing EF of 45-50% at this time) and started on eliquis which was discontinued. Pt says he had an unknown viral illness ~2 weeks ago with fevers that has since resolved. He also admits to recent weight loss. In ED, patient was found to be in AFib with RVR with HR in the 130-140s. Labs remarkable for a BNP of 9229, HsTnT 16. Patient was given Lopressor 25mg PO x1 and Lopressor 5mg IVP. Pt was started on Eliquis 5mg PO BID. Patient admitted to cardiac telemetry for rapid afib management.    During hospital course, pt underwent TTE 3/5/24 revealing LVEF 19%, severely reduced LVSF with global hypokinesis, mod dilated LV size, mod reduced RVSF, mild biatrial enlargement, mod functional MR. Clinically patient euvolemic and did not appear acutely decompensated on exam, therefore lasix therapy was not initiated. Pt underwent successful KATHY/DCCV on 3/5/24, now in sinus rhythm. To maintain sinus rhythm, pt was loaded on amiodarone therapy and continued on maintenance therapy.     Pt was initiated on GDMT including Toprol XL 12.5 mg BID, entresto 24-26 mg BID, Farxiga 10mg QD.     Given newly reduced LVEF, pt underwent CCTA to rule out ischemic cardiomyopathy on 3/6/24 revealing: not acute ischemic cardiomyopathy.  Preliminary report discussed with Dr. Oliva:   "Cardiac: 1.  The calcium score is severe at 623 Agatston units, which is at the 76 percentile, adjusted for age, gender and race. 2.  Severe stenosis of the Ramus.  3.  Probably nonobstructive distal LAD. 4.  Moderate stenosis of the RPL. 5.  Nonobstructive disease in portions of the remaining segments.    Non-cardiac:  Cardiac findings independently dictated by the Cardiology Attending.  Non-cardiac findings independently dictated by the Radiology Attending.    M.D., Attending Cardiologist  M.D., Attending Radiologist  ******PRELIMINARY REPORT******    ******PRELIMINARY REPORT****** "    As discussed with Dr. Oliva, CCTA does not portray ischemic cardiomyopathy.     Course c/b pt reentering Efib w/ RVR on 3/6. Pt underwent unsuccessful DCCV on 3/7.    Pt is asymptomatic at this time and denies chest pain, SOB, ANN, palpitations, dizziness, LOC, N/V, diaphoresis, orthopnea/PND, and leg swelling. Pt able to ambulate and void without complication. VSS. Labs and telemetry reviewed. Pt is a candidate for discharge per Dr. Oliva. Pt given appropriate discharge instructions, pt states they have an appropriate amount of their previous home meds unchanged from this visit at home, and any new medications were sent to their pharmacy. Pt instructed to f/u with Hazel in 2-3 weeks.    Discharge meds: Eliquis 5 mg BID, Toprol XL 25 mg BID, Entresto 24-26 mg BID, Amiodarone 400mg bid then amio 200mg qd, spironolactone 12.5mg qd

## 2024-03-06 NOTE — DISCHARGE NOTE PROVIDER - NSDCMRMEDTOKEN_GEN_ALL_CORE_FT
Eliquis 5 mg oral tablet: 1 tab(s) orally 2 times a day fields [facility]  Farxiga 10 mg oral tablet: 1 tab(s) orally once a day fields check [facility] on Teams  metoprolol succinate 25 mg oral tablet, extended release: 1 tab(s) orally once a day  pantoprazole 40 mg oral delayed release tablet: 1 tab(s) orally once a day  sacubitril-valsartan 24 mg-26 mg oral tablet: 1 tab(s) orally 2 times a day fields check [facility] on Teams   amiodarone 200 mg oral tablet: 1 tab(s) orally once a day  amiodarone 400 mg oral tablet: 1 tab(s) orally 2 times a day Please take your last dose 3/13 in the morning. Then on 3/14 start amio 200mg  Eliquis 5 mg oral tablet: 1 tab(s) orally 2 times a day fields check [facility] on Teams  Farxiga 10 mg oral tablet: 1 tab(s) orally once a day fields check [facility] on Teams  metoprolol succinate 25 mg oral tablet, extended release: 1 tab(s) orally 2 times a day  pantoprazole 40 mg oral delayed release tablet: 1 tab(s) orally once a day  sacubitril-valsartan 24 mg-26 mg oral tablet: 1 tab(s) orally 2 times a day fields check [facility] on Teams  spironolactone 25 mg oral tablet: 0.5 tab(s) orally once a day

## 2024-03-07 ENCOUNTER — TRANSCRIPTION ENCOUNTER (OUTPATIENT)
Age: 79
End: 2024-03-07

## 2024-03-07 VITALS — HEART RATE: 114 BPM | RESPIRATION RATE: 18 BRPM | DIASTOLIC BLOOD PRESSURE: 90 MMHG | SYSTOLIC BLOOD PRESSURE: 120 MMHG

## 2024-03-07 LAB
ALBUMIN SERPL ELPH-MCNC: 3.9 G/DL — SIGNIFICANT CHANGE UP (ref 3.3–5)
ALP SERPL-CCNC: 76 U/L — SIGNIFICANT CHANGE UP (ref 40–120)
ALT FLD-CCNC: 21 U/L — SIGNIFICANT CHANGE UP (ref 10–45)
ANION GAP SERPL CALC-SCNC: 10 MMOL/L — SIGNIFICANT CHANGE UP (ref 5–17)
APTT BLD: 29.7 SEC — SIGNIFICANT CHANGE UP (ref 24.5–35.6)
AST SERPL-CCNC: 23 U/L — SIGNIFICANT CHANGE UP (ref 10–40)
BILIRUB SERPL-MCNC: 0.9 MG/DL — SIGNIFICANT CHANGE UP (ref 0.2–1.2)
BUN SERPL-MCNC: 19 MG/DL — SIGNIFICANT CHANGE UP (ref 7–23)
CALCIUM SERPL-MCNC: 8.9 MG/DL — SIGNIFICANT CHANGE UP (ref 8.4–10.5)
CHLORIDE SERPL-SCNC: 106 MMOL/L — SIGNIFICANT CHANGE UP (ref 96–108)
CO2 SERPL-SCNC: 24 MMOL/L — SIGNIFICANT CHANGE UP (ref 22–31)
CREAT SERPL-MCNC: 1.2 MG/DL — SIGNIFICANT CHANGE UP (ref 0.5–1.3)
EGFR: 63 ML/MIN/1.73M2 — SIGNIFICANT CHANGE UP
GLUCOSE SERPL-MCNC: 94 MG/DL — SIGNIFICANT CHANGE UP (ref 70–99)
HCT VFR BLD CALC: 43.5 % — SIGNIFICANT CHANGE UP (ref 39–50)
HGB BLD-MCNC: 14.2 G/DL — SIGNIFICANT CHANGE UP (ref 13–17)
INR BLD: 1.3 — HIGH (ref 0.85–1.18)
MAGNESIUM SERPL-MCNC: 2 MG/DL — SIGNIFICANT CHANGE UP (ref 1.6–2.6)
MCHC RBC-ENTMCNC: 30.7 PG — SIGNIFICANT CHANGE UP (ref 27–34)
MCHC RBC-ENTMCNC: 32.6 GM/DL — SIGNIFICANT CHANGE UP (ref 32–36)
MCV RBC AUTO: 94.2 FL — SIGNIFICANT CHANGE UP (ref 80–100)
NRBC # BLD: 0 /100 WBCS — SIGNIFICANT CHANGE UP (ref 0–0)
PLATELET # BLD AUTO: 208 K/UL — SIGNIFICANT CHANGE UP (ref 150–400)
POTASSIUM SERPL-MCNC: 4.5 MMOL/L — SIGNIFICANT CHANGE UP (ref 3.5–5.3)
POTASSIUM SERPL-SCNC: 4.5 MMOL/L — SIGNIFICANT CHANGE UP (ref 3.5–5.3)
PROT SERPL-MCNC: 6.4 G/DL — SIGNIFICANT CHANGE UP (ref 6–8.3)
PROTHROM AB SERPL-ACNC: 14.7 SEC — HIGH (ref 9.5–13)
RBC # BLD: 4.62 M/UL — SIGNIFICANT CHANGE UP (ref 4.2–5.8)
RBC # FLD: 13.2 % — SIGNIFICANT CHANGE UP (ref 10.3–14.5)
SODIUM SERPL-SCNC: 140 MMOL/L — SIGNIFICANT CHANGE UP (ref 135–145)
WBC # BLD: 6.91 K/UL — SIGNIFICANT CHANGE UP (ref 3.8–10.5)
WBC # FLD AUTO: 6.91 K/UL — SIGNIFICANT CHANGE UP (ref 3.8–10.5)

## 2024-03-07 PROCEDURE — 99291 CRITICAL CARE FIRST HOUR: CPT | Mod: 25

## 2024-03-07 PROCEDURE — 83880 ASSAY OF NATRIURETIC PEPTIDE: CPT

## 2024-03-07 PROCEDURE — 83036 HEMOGLOBIN GLYCOSYLATED A1C: CPT

## 2024-03-07 PROCEDURE — 85027 COMPLETE CBC AUTOMATED: CPT

## 2024-03-07 PROCEDURE — 85025 COMPLETE CBC W/AUTO DIFF WBC: CPT

## 2024-03-07 PROCEDURE — 85730 THROMBOPLASTIN TIME PARTIAL: CPT

## 2024-03-07 PROCEDURE — 83735 ASSAY OF MAGNESIUM: CPT

## 2024-03-07 PROCEDURE — 75574 CT ANGIO HRT W/3D IMAGE: CPT | Mod: MC

## 2024-03-07 PROCEDURE — 96374 THER/PROPH/DIAG INJ IV PUSH: CPT

## 2024-03-07 PROCEDURE — 84443 ASSAY THYROID STIM HORMONE: CPT

## 2024-03-07 PROCEDURE — 36415 COLL VENOUS BLD VENIPUNCTURE: CPT

## 2024-03-07 PROCEDURE — 87521 HEPATITIS C PROBE&RVRS TRNSC: CPT

## 2024-03-07 PROCEDURE — 80053 COMPREHEN METABOLIC PANEL: CPT

## 2024-03-07 PROCEDURE — 84484 ASSAY OF TROPONIN QUANT: CPT

## 2024-03-07 PROCEDURE — C8925: CPT

## 2024-03-07 PROCEDURE — 80061 LIPID PANEL: CPT

## 2024-03-07 PROCEDURE — 86803 HEPATITIS C AB TEST: CPT

## 2024-03-07 PROCEDURE — 85610 PROTHROMBIN TIME: CPT

## 2024-03-07 PROCEDURE — 80048 BASIC METABOLIC PNL TOTAL CA: CPT

## 2024-03-07 PROCEDURE — 71045 X-RAY EXAM CHEST 1 VIEW: CPT

## 2024-03-07 PROCEDURE — C8929: CPT

## 2024-03-07 RX ORDER — SACUBITRIL AND VALSARTAN 24; 26 MG/1; MG/1
1 TABLET, FILM COATED ORAL
Qty: 60 | Refills: 3
Start: 2024-03-07 | End: 2024-07-04

## 2024-03-07 RX ORDER — METOPROLOL TARTRATE 50 MG
1 TABLET ORAL
Qty: 60 | Refills: 3
Start: 2024-03-07 | End: 2024-07-04

## 2024-03-07 RX ORDER — SODIUM CHLORIDE 9 MG/ML
250 INJECTION INTRAMUSCULAR; INTRAVENOUS; SUBCUTANEOUS ONCE
Refills: 0 | Status: COMPLETED | OUTPATIENT
Start: 2024-03-07 | End: 2024-03-07

## 2024-03-07 RX ORDER — METOPROLOL TARTRATE 50 MG
1 TABLET ORAL
Refills: 0 | DISCHARGE

## 2024-03-07 RX ORDER — SPIRONOLACTONE 25 MG/1
12.5 TABLET, FILM COATED ORAL DAILY
Refills: 0 | Status: DISCONTINUED | OUTPATIENT
Start: 2024-03-07 | End: 2024-03-07

## 2024-03-07 RX ORDER — APIXABAN 2.5 MG/1
1 TABLET, FILM COATED ORAL
Qty: 60 | Refills: 3
Start: 2024-03-07 | End: 2024-07-04

## 2024-03-07 RX ORDER — SPIRONOLACTONE 25 MG/1
0.5 TABLET, FILM COATED ORAL
Qty: 15 | Refills: 3
Start: 2024-03-07 | End: 2024-07-04

## 2024-03-07 RX ORDER — AMIODARONE HYDROCHLORIDE 400 MG/1
1 TABLET ORAL
Qty: 30 | Refills: 3
Start: 2024-03-07 | End: 2024-07-04

## 2024-03-07 RX ORDER — DAPAGLIFLOZIN 10 MG/1
1 TABLET, FILM COATED ORAL
Qty: 30 | Refills: 3
Start: 2024-03-07 | End: 2024-07-04

## 2024-03-07 RX ORDER — AMIODARONE HYDROCHLORIDE 400 MG/1
1 TABLET ORAL
Qty: 12 | Refills: 0
Start: 2024-03-07 | End: 2024-03-12

## 2024-03-07 RX ADMIN — PANTOPRAZOLE SODIUM 40 MILLIGRAM(S): 20 TABLET, DELAYED RELEASE ORAL at 05:14

## 2024-03-07 RX ADMIN — AMIODARONE HYDROCHLORIDE 400 MILLIGRAM(S): 400 TABLET ORAL at 06:36

## 2024-03-07 RX ADMIN — APIXABAN 5 MILLIGRAM(S): 2.5 TABLET, FILM COATED ORAL at 05:14

## 2024-03-07 RX ADMIN — Medication 12.5 MILLIGRAM(S): at 05:14

## 2024-03-07 RX ADMIN — SODIUM CHLORIDE 500 MILLILITER(S): 9 INJECTION INTRAMUSCULAR; INTRAVENOUS; SUBCUTANEOUS at 16:14

## 2024-03-07 RX ADMIN — Medication 12.5 MILLIGRAM(S): at 17:20

## 2024-03-07 RX ADMIN — SACUBITRIL AND VALSARTAN 1 TABLET(S): 24; 26 TABLET, FILM COATED ORAL at 06:36

## 2024-03-07 RX ADMIN — AMIODARONE HYDROCHLORIDE 400 MILLIGRAM(S): 400 TABLET ORAL at 17:21

## 2024-03-07 RX ADMIN — APIXABAN 5 MILLIGRAM(S): 2.5 TABLET, FILM COATED ORAL at 17:21

## 2024-03-07 NOTE — PROGRESS NOTE ADULT - SUBJECTIVE AND OBJECTIVE BOX
Interventional Cardiology PA Adult Progress Note    Subjective Assessment:  	  MEDICATIONS:  aMIOdarone    Tablet   Oral   aMIOdarone    Tablet 400 milliGRAM(s) Oral every 12 hours  metoprolol succinate ER 12.5 milliGRAM(s) Oral two times a day  sacubitril 24 mG/valsartan 26 mG 1 Tablet(s) Oral two times a day        melatonin 5 milliGRAM(s) Oral once PRN    pantoprazole    Tablet 40 milliGRAM(s) Oral before breakfast      apixaban 5 milliGRAM(s) Oral every 12 hours      	    [PHYSICAL EXAM:  TELEMETRY:  T(C): 36.7 (03-07-24 @ 04:55), Max: 36.9 (03-06-24 @ 13:33)  HR: 98 (03-07-24 @ 06:30) (65 - 139)  BP: 111/88 (03-07-24 @ 06:30) (98/69 - 128/61)  RR: 19 (03-07-24 @ 06:30) (18 - 20)  SpO2: 95% (03-07-24 @ 06:30) (92% - 95%)  Wt(kg): --  I&O's Summary    06 Mar 2024 07:01  -  07 Mar 2024 07:00  --------------------------------------------------------  IN: 200 mL / OUT: 620 mL / NET: -420 mL        Ann:  Central/PICC/Mid Line:                                         Appearance: Normal	  HEENT:   Normal oral mucosa, PERRL, EOMI	  Neck: Supple, + JVD/ - JVD; Carotid Bruit   Cardiovascular: Normal S1 S2, No JVD, No murmurs,   Respiratory: Lungs clear to auscultation/Decreased Breath Sounds/No Rales, Rhonchi, Wheezing	  Gastrointestinal:  Soft, Non-tender, + BS	  Skin: No rashes, No ecchymoses, No cyanosis  Extremities: Normal range of motion, No clubbing, cyanosis or edema  Vascular: Peripheral pulses palpable 2+ bilaterally  Neurologic: Non-focal  Psychiatry: A & O x 3, Mood & affect appropriate      	    ECG:  	  RADIOLOGY:   DIAGNOSTIC TESTING:  [ ] Echocardiogram:  [ ]  Catheterization:  [ ] Stress Test:    [ ] KATHY  OTHER: 	    LABS:	 	  CARDIAC MARKERS:                                  14.2   6.91  )-----------( 208      ( 07 Mar 2024 05:30 )             43.5     03-07    140  |  106  |  19  ----------------------------<  94  4.5   |  24  |  1.20    Ca    8.9      07 Mar 2024 05:30  Mg     2.0     03-07    TPro  6.4  /  Alb  3.9  /  TBili  0.9  /  DBili  x   /  AST  23  /  ALT  21  /  AlkPhos  76  03-07    proBNP:   Lipid Profile:   HgA1c:   TSH:   PT/INR - ( 07 Mar 2024 05:30 )   PT: 14.7 sec;   INR: 1.30          PTT - ( 07 Mar 2024 05:30 )  PTT:29.7 sec    ASSESSMENT/PLAN: 	        DVT ppx:  Dispo:     Interventional Cardiology PA Adult Progress Note    Subjective Assessment: Pt evaluated at beside this AM. Pt appears well and NAD. Denies CP, SOB, palpitation, n/v/d, fever, LOC, or headache.   	  MEDICATIONS:  aMIOdarone    Tablet   Oral   aMIOdarone    Tablet 400 milliGRAM(s) Oral every 12 hours  metoprolol succinate ER 12.5 milliGRAM(s) Oral two times a day  sacubitril 24 mG/valsartan 26 mG 1 Tablet(s) Oral two times a day  melatonin 5 milliGRAM(s) Oral once PRN  pantoprazole    Tablet 40 milliGRAM(s) Oral before breakfast  apixaban 5 milliGRAM(s) Oral every 12 hours      [PHYSICAL EXAM:  TELEMETRY:  T(C): 36.7 (03-07-24 @ 04:55), Max: 36.9 (03-06-24 @ 13:33)  HR: 98 (03-07-24 @ 06:30) (65 - 139)  BP: 111/88 (03-07-24 @ 06:30) (98/69 - 128/61)  RR: 19 (03-07-24 @ 06:30) (18 - 20)  SpO2: 95% (03-07-24 @ 06:30) (92% - 95%)  Wt(kg): --  I&O's Summary    06 Mar 2024 07:01  -  07 Mar 2024 07:00  --------------------------------------------------------  IN: 200 mL / OUT: 620 mL / NET: -420 mL        Ann:  Central/PICC/Mid Line:                                         Appearance: Normal	  HEENT:   Normal oral mucosa, PERRL, EOMI	  Neck: Supple, - JVD; Carotid Bruit   Cardiovascular: Tachy, irregular, S1 S2, No JVD, No murmurs,   Respiratory: Lungs clear to auscultation. No Rales, Rhonchi, Wheezing	  Gastrointestinal:  Soft, Non-tender, + BS	  Skin: No rashes, No ecchymoses, No cyanosis  Extremities: Normal range of motion, No clubbing, cyanosis or edema  Vascular: Peripheral pulses palpable 2+ bilaterally  Neurologic: Non-focal  Psychiatry: A & O x 3, Mood & affect appropriate      	                            14.2   6.91  )-----------( 208      ( 07 Mar 2024 05:30 )             43.5     03-07    140  |  106  |  19  ----------------------------<  94  4.5   |  24  |  1.20    Ca    8.9      07 Mar 2024 05:30  Mg     2.0     03-07    TPro  6.4  /  Alb  3.9  /  TBili  0.9  /  DBili  x   /  AST  23  /  ALT  21  /  AlkPhos  76  03-07    proBNP:   Lipid Profile:   HgA1c:   TSH:   PT/INR - ( 07 Mar 2024 05:30 )   PT: 14.7 sec;   INR: 1.30          PTT - ( 07 Mar 2024 05:30 )  PTT:29.7 sec

## 2024-03-07 NOTE — PROGRESS NOTE ADULT - SUBJECTIVE AND OBJECTIVE BOX
JOE MACK  MRN-[id number]  3-7-24    events noted - s/p dccv - on chf meds, sr -   s/p cta - ramus occulsion, otherwise nonobstructive cad  echo (pre-dccv - ) - ef 19%  was about to leave yest - then back into afib/rvr    loaded w iv amio  -150   started amio 400 bid   this am remains in afib - hr      plan -  will do dccv today,   hopeful d/c home after  cont amio load 400 bid x 1 week, then 200 qd  cont entresto/farxiga/toprol 12.5 bid and eliquis    Vital Signs Last 24 Hrs  T(C): 36.7 (03-07-24 @ 04:55), Max: 36.9 (03-06-24 @ 13:33)  T(F): 98.1 (03-07-24 @ 04:55), Max: 98.4 (03-06-24 @ 13:33)  HR: 113 (03-07-24 @ 08:25) (65 - 139)  BP: 123/58 (03-07-24 @ 08:25) (98/69 - 128/61)  BP(mean): 79 (03-07-24 @ 08:25) (78 - 97)  RR: 17 (03-07-24 @ 08:25) (17 - 20)  SpO2: 95% (03-07-24 @ 08:25) (92% - 95%)                          14.2   6.91  )-----------( 208      ( 07 Mar 2024 05:30 )             43.5     03-07    140  |  106  |  19  ----------------------------<  94  4.5   |  24  |  1.20    Ca    8.9      07 Mar 2024 05:30  Mg     2.0     03-07    TPro  6.4  /  Alb  3.9  /  TBili  0.9  /  DBili  x   /  AST  23  /  ALT  21  /  AlkPhos  76  03-07    MEDICATIONS  (STANDING):  aMIOdarone    Tablet   Oral   aMIOdarone    Tablet 400 milliGRAM(s) Oral every 12 hours  apixaban 5 milliGRAM(s) Oral every 12 hours  metoprolol succinate ER 12.5 milliGRAM(s) Oral two times a day  pantoprazole    Tablet 40 milliGRAM(s) Oral before breakfast  sacubitril 24 mG/valsartan 26 mG 1 Tablet(s) Oral two times a day    MEDICATIONS  (PRN):  melatonin 5 milliGRAM(s) Oral once PRN Insomnia

## 2024-03-07 NOTE — PROGRESS NOTE ADULT - PROBLEM SELECTOR PLAN 2
Clinically euvolemic with warm extremities. spO2 99% RA, Lungs clear, no edema  -Diuretic: None  -Etiology: Likely Tachy-induced CM  -Strict I&Os, daily weights, fluid restriction, core measures  -TTE 3/5/24: LVEF 19%, severely reduced LVSF with global hypokinesis, mod dilated LV size, mod reduced RVSF, mild biatrial enlargement, mod functional MR.   -GDMT: Toprol as above; Entresto 24-26 BID, Farxiga 10mg qd  -CCTA to r/o ischemic heart disease 3/6: preliminary report minimally revealing (only CAD to Ramus, RPL), and pt was preparing for discharge, however reverted back into rapid afib as detailed above.    F: NONE  D: DASH diet.  Keep NPO-mn in case of any EP intervention   Lytes keep K >4, Mg >2  DVT ppx: Eliquis  Dispo: Pending clinical progression  Case discussed with Dr. Oliva. Clinically euvolemic with warm extremities. spO2 99% RA, Lungs clear, no edema  -Diuretic: None  -Etiology: Likely Tachy-induced CM  -TTE 3/5/24: LVEF 19%, severely reduced LVSF with global hypokinesis, mod dilated LV size, mod reduced RVSF, mild biatrial enlargement, mod functional MR.   -GDMT: Toprol as above; Entresto 24-26 BID, Farxiga 10mg qd  -CCTA to r/o ischemic heart disease 3/6: preliminary report minimally revealing (only CAD to Ramus, RPL), and pt was preparing for discharge, however reverted back into rapid afib as detailed above.    F: NONE  D: DASH diet.  Keep NPO-mn in case of any EP intervention   Lytes keep K >4, Mg >2  DVT ppx: Eliquis  Dispo: Pending clinical progression

## 2024-03-07 NOTE — PROGRESS NOTE ADULT - ASSESSMENT
for dccv today,   then hopeful d/c w plans to cont same meds as above  please add aldactone 12.5 qd in addition  to f/u in office 2-3 week,     indy wilkinson md   c [phone]

## 2024-03-07 NOTE — DISCHARGE NOTE NURSING/CASE MANAGEMENT/SOCIAL WORK - PATIENT PORTAL LINK FT
You can access the FollowMyHealth Patient Portal offered by [facility] by registering at the following website: [email or web address]. By joining [employer]’s FollowMyHealth portal, you will also be able to view your health information using other applications (apps) compatible with our system.

## 2024-03-07 NOTE — PROGRESS NOTE ADULT - PROBLEM SELECTOR PLAN 1
Initially, rapid AF w/ HR in 130-150 bpm.   -Pt initially converted to Sinus rhythm s/p KATHY/DCCV on 3/5/24, then at time of discharge on 3/6/24 at ~7pm on Telemetry was found to return to Rapid AFib 120s-140s.   -Antiarrythmic: STAT 150mg IVPB Amio x1; C/w PO Amio load of 400mg BID x7 days per EP (started 3/6/24 AM) then 200mg qd following   -Rate control: C/w Toprol 12.5 mg BID   -AC: c/w Eliquis 5 mg BID (CHADSVASC 3)  -c/w close telemetry monitoring -120s  -Pt initially converted to Sinus rhythm s/p KATHY/DCCV on 3/5/24, then at time of discharge on 3/6/24 at ~7pm on Telemetry was found to return to Rapid AFib 120s-140s.   -Antiarrythmic: STAT 150mg IVPB Amio x1; C/w PO Amio load of 400mg BID x7 days per EP (started 3/6/24 AM) then 200mg qd following   -Rate control: C/w Toprol 12.5 mg BID   -AC: c/w Eliquis 5 mg BID (CHADSVASC 3)  -c/w close telemetry monitoring

## 2024-03-28 ENCOUNTER — OUTPATIENT (OUTPATIENT)
Dept: OUTPATIENT SERVICES | Facility: HOSPITAL | Age: 79
LOS: 1 days | Discharge: HOME | End: 2024-03-28
Payer: MEDICARE

## 2024-03-28 ENCOUNTER — TRANSCRIPTION ENCOUNTER (OUTPATIENT)
Age: 79
End: 2024-03-28

## 2024-03-28 PROCEDURE — 93010 ELECTROCARDIOGRAM REPORT: CPT

## 2024-03-29 PROBLEM — B19.20 UNSPECIFIED VIRAL HEPATITIS C WITHOUT HEPATIC COMA: Chronic | Status: ACTIVE | Noted: 2024-03-04

## 2024-07-18 ENCOUNTER — INPATIENT (INPATIENT)
Facility: HOSPITAL | Age: 79
LOS: 0 days | Discharge: HOME | DRG: 274 | End: 2024-07-19
Attending: INTERNAL MEDICINE | Admitting: INTERNAL MEDICINE
Payer: COMMERCIAL

## 2024-07-18 VITALS
OXYGEN SATURATION: 98 % | HEART RATE: 62 BPM | SYSTOLIC BLOOD PRESSURE: 140 MMHG | DIASTOLIC BLOOD PRESSURE: 87 MMHG | TEMPERATURE: 98 F | RESPIRATION RATE: 16 BRPM

## 2024-07-18 LAB
ANION GAP SERPL CALC-SCNC: 10 MMOL/L — SIGNIFICANT CHANGE UP (ref 5–17)
APTT BLD: 29.4 SEC — SIGNIFICANT CHANGE UP (ref 24.5–35.6)
BLD GP AB SCN SERPL QL: NEGATIVE — SIGNIFICANT CHANGE UP
BUN SERPL-MCNC: 25 MG/DL — HIGH (ref 7–23)
CALCIUM SERPL-MCNC: 9.7 MG/DL — SIGNIFICANT CHANGE UP (ref 8.4–10.5)
CHLORIDE SERPL-SCNC: 106 MMOL/L — SIGNIFICANT CHANGE UP (ref 96–108)
CO2 SERPL-SCNC: 23 MMOL/L — SIGNIFICANT CHANGE UP (ref 22–31)
CREAT SERPL-MCNC: 1.14 MG/DL — SIGNIFICANT CHANGE UP (ref 0.5–1.3)
EGFR: 67 ML/MIN/1.73M2 — SIGNIFICANT CHANGE UP
GLUCOSE SERPL-MCNC: 98 MG/DL — SIGNIFICANT CHANGE UP (ref 70–99)
HCT VFR BLD CALC: 40.2 % — SIGNIFICANT CHANGE UP (ref 39–50)
HGB BLD-MCNC: 13.4 G/DL — SIGNIFICANT CHANGE UP (ref 13–17)
INR BLD: 0.9 — SIGNIFICANT CHANGE UP (ref 0.85–1.18)
ISTAT ACTK (ACTIVATED CLOTTING TIME KAOLIN): 262 SEC — HIGH (ref 74–137)
ISTAT ACTK (ACTIVATED CLOTTING TIME KAOLIN): 311 SEC — HIGH (ref 74–137)
ISTAT ACTK (ACTIVATED CLOTTING TIME KAOLIN): 324 SEC — HIGH (ref 74–137)
ISTAT INR: 1.1 — SIGNIFICANT CHANGE UP (ref 0.88–1.16)
ISTAT PT: 12.8 SEC — SIGNIFICANT CHANGE UP (ref 10–12.9)
ISTAT VENOUS BE: 0 MMOL/L — SIGNIFICANT CHANGE UP (ref -2–3)
ISTAT VENOUS GLUCOSE: 101 MG/DL — HIGH (ref 70–99)
ISTAT VENOUS HCO3: 26 MMOL/L — SIGNIFICANT CHANGE UP (ref 23–28)
ISTAT VENOUS HEMATOCRIT: 41 % — SIGNIFICANT CHANGE UP (ref 39–50)
ISTAT VENOUS HEMOGLOBIN: 13.9 GM/DL — SIGNIFICANT CHANGE UP (ref 13–17)
ISTAT VENOUS IONIZED CALCIUM: 1.22 MMOL/L — SIGNIFICANT CHANGE UP (ref 1.12–1.3)
ISTAT VENOUS PCO2: 46 MMHG — SIGNIFICANT CHANGE UP (ref 41–51)
ISTAT VENOUS PH: 7.36 — SIGNIFICANT CHANGE UP (ref 7.31–7.41)
ISTAT VENOUS PO2: <66 MMHG — LOW (ref 35–40)
ISTAT VENOUS POTASSIUM: 4.5 MMOL/L — SIGNIFICANT CHANGE UP (ref 3.5–5.3)
ISTAT VENOUS SO2: 37 % — SIGNIFICANT CHANGE UP
ISTAT VENOUS SODIUM: 140 MMOL/L — SIGNIFICANT CHANGE UP (ref 135–145)
ISTAT VENOUS TCO2: 27 MMOL/L — SIGNIFICANT CHANGE UP (ref 22–31)
MCHC RBC-ENTMCNC: 31.5 PG — SIGNIFICANT CHANGE UP (ref 27–34)
MCHC RBC-ENTMCNC: 33.3 GM/DL — SIGNIFICANT CHANGE UP (ref 32–36)
MCV RBC AUTO: 94.4 FL — SIGNIFICANT CHANGE UP (ref 80–100)
NRBC # BLD: 0 /100 WBCS — SIGNIFICANT CHANGE UP (ref 0–0)
PLATELET # BLD AUTO: 264 K/UL — SIGNIFICANT CHANGE UP (ref 150–400)
POCT ISTAT CREATININE: 1.2 MG/DL — SIGNIFICANT CHANGE UP (ref 0.5–1.3)
POTASSIUM SERPL-MCNC: 4.7 MMOL/L — SIGNIFICANT CHANGE UP (ref 3.5–5.3)
POTASSIUM SERPL-SCNC: 4.7 MMOL/L — SIGNIFICANT CHANGE UP (ref 3.5–5.3)
PROTHROM AB SERPL-ACNC: 10.3 SEC — SIGNIFICANT CHANGE UP (ref 9.5–13)
RBC # BLD: 4.26 M/UL — SIGNIFICANT CHANGE UP (ref 4.2–5.8)
RBC # FLD: 13.3 % — SIGNIFICANT CHANGE UP (ref 10.3–14.5)
RH IG SCN BLD-IMP: POSITIVE — SIGNIFICANT CHANGE UP
SODIUM SERPL-SCNC: 139 MMOL/L — SIGNIFICANT CHANGE UP (ref 135–145)
WBC # BLD: 6.43 K/UL — SIGNIFICANT CHANGE UP (ref 3.8–10.5)
WBC # FLD AUTO: 6.43 K/UL — SIGNIFICANT CHANGE UP (ref 3.8–10.5)

## 2024-07-18 RX ORDER — TRAMADOL HYDROCHLORIDE 50 MG/1
1 TABLET, FILM COATED ORAL
Refills: 0 | DISCHARGE

## 2024-07-18 RX ORDER — TRAMADOL HYDROCHLORIDE 50 MG/1
50 TABLET, FILM COATED ORAL
Refills: 0 | Status: DISCONTINUED | OUTPATIENT
Start: 2024-07-18 | End: 2024-07-19

## 2024-07-18 RX ORDER — CARVEDILOL PHOSPHATE 80 MG/1
3.12 CAPSULE, EXTENDED RELEASE ORAL EVERY 12 HOURS
Refills: 0 | Status: DISCONTINUED | OUTPATIENT
Start: 2024-07-18 | End: 2024-07-19

## 2024-07-18 RX ORDER — APIXABAN 5 MG/1
5 TABLET, FILM COATED ORAL
Refills: 0 | Status: DISCONTINUED | OUTPATIENT
Start: 2024-07-18 | End: 2024-07-19

## 2024-07-18 RX ADMIN — APIXABAN 5 MILLIGRAM(S): 5 TABLET, FILM COATED ORAL at 22:52

## 2024-07-18 NOTE — H&P ADULT - ASSESSMENT
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  He is off GDMT, remains only on Carvedilol, given side effects (fatigue / dizziness).    - NPO for AFIB ablation (pulsed field ablation)   - Bedrest post procedure per protocol  - Continue A/C post procedure (6 hours post procedure)   - Continue home meds.

## 2024-07-18 NOTE — H&P ADULT - HISTORY OF PRESENT ILLNESS
79 yo M with h/o HTN, paroxysmal afib, prostate cancer (s/p prostatectomy), macular degeneration (s/p recent operation), arthritis, Hep C (s/p tx), here for afib ablation.     He had a recent admission to [facility] in March 2024 for CHF exacerbation in setting of AFIB RVR.  His Echo in AFIB RVR showed LVEF 10%.  He had CTA that showed ramus occlusion, otherwise non-obstructive CAD.  He underwent DCCV but went back to AFIB the next time, and required him to be loaded on Amio. Underwent 2nd dccv a couple of days later and was sent home in NSR. He was started on GDMT.  He was weaned off Amio eventually. He remained in NSR on follow up outpatient.  He couldn't tolerate the GDMT (Metoprolol, Entresto, Spironolactone, farxiga) due to side effects (fatigue, dizziness).  His repeat Echo 3 months post GDMT showed marked improvement; LVEF 50%.  He continues to be on Eliquis BID without bleeding issue.  His Metoprolol was switched to Carvedilol BID.    He is here for AFIB ablation given how he went into tachy-induced cardiomyopathy when he was in AFIB RVR.     Last dose of Eliquis was yesterday.

## 2024-07-18 NOTE — PATIENT PROFILE ADULT - FALL HARM RISK - UNIVERSAL INTERVENTIONS
Bed in lowest position, wheels locked, appropriate side rails in place/Call bell, personal items and telephone in reach/Instruct patient to call for assistance before getting out of bed or chair/Non-slip footwear when patient is out of bed/[place] to call system/Physically safe environment - no spills, clutter or unnecessary equipment/Purposeful Proactive Rounding/Room/bathroom lighting operational, light cord in reach

## 2024-07-19 ENCOUNTER — TRANSCRIPTION ENCOUNTER (OUTPATIENT)
Age: 79
End: 2024-07-19

## 2024-07-19 VITALS — TEMPERATURE: 99 F

## 2024-07-19 PROCEDURE — 82803 BLOOD GASES ANY COMBINATION: CPT

## 2024-07-19 PROCEDURE — 82947 ASSAY GLUCOSE BLOOD QUANT: CPT

## 2024-07-19 PROCEDURE — C1730: CPT

## 2024-07-19 PROCEDURE — 82565 ASSAY OF CREATININE: CPT

## 2024-07-19 PROCEDURE — 86900 BLOOD TYPING SEROLOGIC ABO: CPT

## 2024-07-19 PROCEDURE — 82330 ASSAY OF CALCIUM: CPT

## 2024-07-19 PROCEDURE — C9399: CPT

## 2024-07-19 PROCEDURE — C1769: CPT

## 2024-07-19 PROCEDURE — C1766: CPT

## 2024-07-19 PROCEDURE — 85027 COMPLETE CBC AUTOMATED: CPT

## 2024-07-19 PROCEDURE — 80048 BASIC METABOLIC PNL TOTAL CA: CPT

## 2024-07-19 PROCEDURE — 85610 PROTHROMBIN TIME: CPT

## 2024-07-19 PROCEDURE — 85347 COAGULATION TIME ACTIVATED: CPT

## 2024-07-19 PROCEDURE — C1733: CPT

## 2024-07-19 PROCEDURE — 85014 HEMATOCRIT: CPT

## 2024-07-19 PROCEDURE — C1760: CPT

## 2024-07-19 PROCEDURE — C1889: CPT

## 2024-07-19 PROCEDURE — C1894: CPT

## 2024-07-19 PROCEDURE — 84295 ASSAY OF SERUM SODIUM: CPT

## 2024-07-19 PROCEDURE — 84132 ASSAY OF SERUM POTASSIUM: CPT

## 2024-07-19 PROCEDURE — 86850 RBC ANTIBODY SCREEN: CPT

## 2024-07-19 PROCEDURE — 85730 THROMBOPLASTIN TIME PARTIAL: CPT

## 2024-07-19 PROCEDURE — 86901 BLOOD TYPING SEROLOGIC RH(D): CPT

## 2024-07-19 PROCEDURE — C1759: CPT

## 2024-07-19 PROCEDURE — 36415 COLL VENOUS BLD VENIPUNCTURE: CPT

## 2024-07-19 RX ORDER — ACETAMINOPHEN 325 MG
650 TABLET ORAL ONCE
Refills: 0 | Status: COMPLETED | OUTPATIENT
Start: 2024-07-19 | End: 2024-07-19

## 2024-07-19 RX ORDER — TRAMADOL HYDROCHLORIDE 50 MG/1
1 TABLET, FILM COATED ORAL
Qty: 0 | Refills: 0 | DISCHARGE
Start: 2024-07-19

## 2024-07-19 RX ORDER — CARVEDILOL PHOSPHATE 80 MG/1
1 CAPSULE, EXTENDED RELEASE ORAL
Refills: 0 | DISCHARGE

## 2024-07-19 RX ORDER — APIXABAN 5 MG/1
1 TABLET, FILM COATED ORAL
Qty: 0 | Refills: 0 | DISCHARGE
Start: 2024-07-19

## 2024-07-19 RX ORDER — TRAMADOL HYDROCHLORIDE 50 MG/1
1 TABLET, FILM COATED ORAL
Refills: 0 | DISCHARGE

## 2024-07-19 RX ORDER — CARVEDILOL PHOSPHATE 80 MG/1
1 CAPSULE, EXTENDED RELEASE ORAL
Qty: 0 | Refills: 0 | DISCHARGE
Start: 2024-07-19

## 2024-07-19 RX ADMIN — Medication 650 MILLIGRAM(S): at 06:37

## 2024-07-19 RX ADMIN — CARVEDILOL PHOSPHATE 3.12 MILLIGRAM(S): 80 CAPSULE, EXTENDED RELEASE ORAL at 06:10

## 2024-07-19 RX ADMIN — Medication 650 MILLIGRAM(S): at 07:25

## 2024-07-19 NOTE — DISCHARGE NOTE PROVIDER - NSDCMRMEDTOKEN_GEN_ALL_CORE_FT
apixaban 5 mg oral tablet: 1 tab(s) orally 2 times a day  carvedilol 3.125 mg oral tablet: 1 tab(s) orally every 12 hours  traMADol 50 mg oral tablet: 1 tab(s) orally 2 times a day As needed for pain

## 2024-07-19 NOTE — DISCHARGE NOTE NURSING/CASE MANAGEMENT/SOCIAL WORK - PATIENT PORTAL LINK FT
You can access the FollowMyHealth Patient Portal offered by [facility] by registering at the following website: [email or web address]. By joining [employer]’s FollowMyHealth portal, you will also be able to view your health information using other applications (apps) compatible with our system.

## 2024-07-19 NOTE — DISCHARGE NOTE PROVIDER - ATTENDING ATTESTATION STATEMENT
I have personally seen and examined the patient. I have collaborated with and supervised the
cerebrovascular accident

## 2024-07-19 NOTE — DISCHARGE NOTE PROVIDER - NSDCADMDATE_GEN_ALL_CORE_FT
18-Jul-2024 17:58

## 2024-07-19 NOTE — DISCHARGE NOTE PROVIDER - NSDCCPTREATMENT_GEN_ALL_CORE_FT
PRINCIPAL PROCEDURE  Procedure: Intracardiac catheter ablation for atrial fibrillation  Findings and Treatment:

## 2024-07-19 NOTE — DISCHARGE NOTE PROVIDER - HOSPITAL COURSE
77 yo M with h/o HTN, paroxysmal afib, prostate cancer (s/p prostatectomy), macular degeneration (s/p recent operation), arthritis, Hep C (s/p tx), underwent AF ablation with Dr. Oliva on 7/18/2024.    He had a recent admission to [facility] in March 2024 for CHF exacerbation in setting of AFIB RVR.  His Echo in AFIB RVR showed LVEF 10%.  He had CTA that showed ramus occlusion, otherwise non-obstructive CAD.  He underwent DCCV but went back to AFIB the next time, and required him to be loaded on Amio. Underwent 2nd dccv a couple of days later and was sent home in NSR. He was started on GDMT.  He was weaned off Amio eventually. He remained in NSR on follow up outpatient.  He couldn't tolerate the GDMT (Metoprolol, Entresto, Spironolactone, farxiga) due to side effects (fatigue, dizziness).  His repeat Echo 3 months post GDMT showed marked improvement; LVEF 50%.  He continues to be on Eliquis BID without bleeding issue.  His Metoprolol was switched to Carvedilol BID. Over night, he was well. He denies chest discomfort, palpitations, or shortness of breath. He is able to ambulate without groin site issues. His telemetry shows sinus rhythm with ventricular rates in 60-70bpm. He had an episode of AF lasting 1 minute on 7/19/2024. He will be discharged home on his medications including Carvedilol and Eliquis. He will follow up with Dr. Oliva in two weeks.

## 2024-07-19 NOTE — DISCHARGE NOTE PROVIDER - CARE PROVIDER_API CALL
uJlián Oliva  Cardiac Electrophysiology  [address] [address]  Phone: [phone]  Fax: [phone]  Follow Up Time: 1-3 days

## 2024-07-19 NOTE — DISCHARGE NOTE PROVIDER - CARE PROVIDERS DIRECT ADDRESSES
[email or web address]

## 2024-07-19 NOTE — DISCHARGE NOTE PROVIDER - EXTENDED VTE YES NO FOR MLM ENOXAPARIN
-Please take medications as prescribed  -Refrain from alcohol or drug use  -Seek emergency help via the emergency and/or calling 911 should symptoms become severe, worsen, or with other concerning symptoms. Go immediately to the emergency room and/or call 911 with any suicidal or homicidal ideations or if audio/visual hallucinations develop.   -Contact office with any questions or concerns. Crisis phone numbers:  [place], and [facility] [phone]. Kate Luu and [place] [address] [facility] [phone]. [place] [phone]. [address] [place] [address]. [place]oula Be [phone]. ,

## 2024-07-19 NOTE — DISCHARGE NOTE PROVIDER - NSDCACTIVITY_GEN_ALL_CORE
Showering allowed/Walking - Indoors allowed/No heavy lifting/straining/Walking - Outdoors allowed/Activity as tolerated

## 2024-07-25 DIAGNOSIS — I10 ESSENTIAL (PRIMARY) HYPERTENSION: ICD-10-CM

## 2024-07-25 DIAGNOSIS — Z86.19 PERSONAL HISTORY OF OTHER INFECTIOUS AND PARASITIC DISEASES: ICD-10-CM

## 2024-07-25 DIAGNOSIS — Z85.46 PERSONAL HISTORY OF MALIGNANT NEOPLASM OF PROSTATE: ICD-10-CM

## 2024-07-25 DIAGNOSIS — I42.8 OTHER CARDIOMYOPATHIES: ICD-10-CM

## 2024-07-25 DIAGNOSIS — I48.0 PAROXYSMAL ATRIAL FIBRILLATION: ICD-10-CM

## 2024-07-25 DIAGNOSIS — Z90.79 ACQUIRED ABSENCE OF OTHER GENITAL ORGAN(S): ICD-10-CM

## 2024-07-25 DIAGNOSIS — I25.10 ATHEROSCLEROTIC HEART DISEASE OF NATIVE CORONARY ARTERY WITHOUT ANGINA PECTORIS: ICD-10-CM

## 2024-07-25 DIAGNOSIS — Z79.01 LONG TERM (CURRENT) USE OF ANTICOAGULANTS: ICD-10-CM

## 2024-07-25 DIAGNOSIS — I48.91 UNSPECIFIED ATRIAL FIBRILLATION: ICD-10-CM
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
## 2025-06-04 NOTE — CONSULT NOTE ADULT - PROBLEM SELECTOR RECOMMENDATION 2
EF on outpatient echo 10%  - recommend repeat echocardiogram   - etiology - likely tachy-induced, start GDMT as tolerated 04-Jun-2025 19:00

## 2025-07-17 ENCOUNTER — INPATIENT (INPATIENT)
Facility: HOSPITAL | Age: 80
LOS: 0 days | Discharge: HOME | DRG: 274 | End: 2025-07-18
Attending: INTERNAL MEDICINE | Admitting: INTERNAL MEDICINE
Payer: COMMERCIAL

## 2025-07-17 VITALS
SYSTOLIC BLOOD PRESSURE: 118 MMHG | OXYGEN SATURATION: 97 % | TEMPERATURE: 98 F | DIASTOLIC BLOOD PRESSURE: 59 MMHG | HEART RATE: 69 BPM | RESPIRATION RATE: 18 BRPM

## 2025-07-17 DIAGNOSIS — Z98.890 OTHER SPECIFIED POSTPROCEDURAL STATES: Chronic | ICD-10-CM

## 2025-07-17 DIAGNOSIS — Z90.79 ACQUIRED ABSENCE OF OTHER GENITAL ORGAN(S): Chronic | ICD-10-CM

## 2025-07-17 LAB
ANION GAP SERPL CALC-SCNC: 8 MMOL/L — SIGNIFICANT CHANGE UP (ref 5–17)
APTT BLD: 31.5 SEC — SIGNIFICANT CHANGE UP (ref 26.1–36.8)
BLD GP AB SCN SERPL QL: NEGATIVE — SIGNIFICANT CHANGE UP
BUN SERPL-MCNC: 20 MG/DL — SIGNIFICANT CHANGE UP (ref 7–23)
CALCIUM SERPL-MCNC: 9.3 MG/DL — SIGNIFICANT CHANGE UP (ref 8.4–10.5)
CHLORIDE SERPL-SCNC: 104 MMOL/L — SIGNIFICANT CHANGE UP (ref 96–108)
CO2 SERPL-SCNC: 26 MMOL/L — SIGNIFICANT CHANGE UP (ref 22–31)
CREAT SERPL-MCNC: 1.05 MG/DL — SIGNIFICANT CHANGE UP (ref 0.5–1.3)
EGFR: 73 ML/MIN/1.73M2 — SIGNIFICANT CHANGE UP
EGFR: 73 ML/MIN/1.73M2 — SIGNIFICANT CHANGE UP
GLUCOSE SERPL-MCNC: 101 MG/DL — HIGH (ref 70–99)
HCT VFR BLD CALC: 40.2 % — SIGNIFICANT CHANGE UP (ref 39–50)
HGB BLD-MCNC: 13.4 G/DL — SIGNIFICANT CHANGE UP (ref 13–17)
INR BLD: 1.33 — HIGH (ref 0.85–1.16)
MCHC RBC-ENTMCNC: 31.2 PG — SIGNIFICANT CHANGE UP (ref 27–34)
MCHC RBC-ENTMCNC: 33.3 G/DL — SIGNIFICANT CHANGE UP (ref 32–36)
MCV RBC AUTO: 93.5 FL — SIGNIFICANT CHANGE UP (ref 80–100)
NRBC # BLD AUTO: 0 K/UL — SIGNIFICANT CHANGE UP (ref 0–0)
NRBC # FLD: 0 K/UL — SIGNIFICANT CHANGE UP (ref 0–0)
NRBC BLD AUTO-RTO: 0 /100 WBCS — SIGNIFICANT CHANGE UP (ref 0–0)
PLATELET # BLD AUTO: 220 K/UL — SIGNIFICANT CHANGE UP (ref 150–400)
PMV BLD: 10.1 FL — SIGNIFICANT CHANGE UP (ref 7–13)
POTASSIUM SERPL-MCNC: 4.7 MMOL/L — SIGNIFICANT CHANGE UP (ref 3.5–5.3)
POTASSIUM SERPL-SCNC: 4.7 MMOL/L — SIGNIFICANT CHANGE UP (ref 3.5–5.3)
PROTHROM AB SERPL-ACNC: 15.2 SEC — HIGH (ref 9.9–13.4)
RBC # BLD: 4.3 M/UL — SIGNIFICANT CHANGE UP (ref 4.2–5.8)
RBC # FLD: 12.1 % — SIGNIFICANT CHANGE UP (ref 10.3–14.5)
RH IG SCN BLD-IMP: POSITIVE — SIGNIFICANT CHANGE UP
SODIUM SERPL-SCNC: 138 MMOL/L — SIGNIFICANT CHANGE UP (ref 135–145)
WBC # BLD: 6.12 K/UL — SIGNIFICANT CHANGE UP (ref 3.8–10.5)
WBC # FLD AUTO: 6.12 K/UL — SIGNIFICANT CHANGE UP (ref 3.8–10.5)

## 2025-07-17 PROCEDURE — 84295 ASSAY OF SERUM SODIUM: CPT

## 2025-07-17 PROCEDURE — 82803 BLOOD GASES ANY COMBINATION: CPT

## 2025-07-17 PROCEDURE — 82565 ASSAY OF CREATININE: CPT

## 2025-07-17 PROCEDURE — 85014 HEMATOCRIT: CPT

## 2025-07-17 PROCEDURE — 82330 ASSAY OF CALCIUM: CPT

## 2025-07-17 PROCEDURE — 82947 ASSAY GLUCOSE BLOOD QUANT: CPT

## 2025-07-17 PROCEDURE — 84132 ASSAY OF SERUM POTASSIUM: CPT

## 2025-07-17 PROCEDURE — 85610 PROTHROMBIN TIME: CPT

## 2025-07-17 PROCEDURE — 85027 COMPLETE CBC AUTOMATED: CPT

## 2025-07-17 PROCEDURE — 85730 THROMBOPLASTIN TIME PARTIAL: CPT

## 2025-07-17 PROCEDURE — 85347 COAGULATION TIME ACTIVATED: CPT

## 2025-07-17 PROCEDURE — 93010 ELECTROCARDIOGRAM REPORT: CPT

## 2025-07-17 PROCEDURE — 80048 BASIC METABOLIC PNL TOTAL CA: CPT

## 2025-07-17 RX ORDER — ESCITALOPRAM OXALATE 20 MG/1
1 TABLET ORAL
Refills: 0 | DISCHARGE

## 2025-07-17 RX ORDER — MELATONIN 5 MG
3 TABLET ORAL AT BEDTIME
Refills: 0 | Status: DISCONTINUED | OUTPATIENT
Start: 2025-07-17 | End: 2025-07-18

## 2025-07-17 RX ORDER — CARVEDILOL 3.12 MG/1
3.12 TABLET, FILM COATED ORAL EVERY 12 HOURS
Refills: 0 | Status: DISCONTINUED | OUTPATIENT
Start: 2025-07-17 | End: 2025-07-18

## 2025-07-17 RX ORDER — APIXABAN 5 MG/1
5 TABLET, FILM COATED ORAL EVERY 12 HOURS
Refills: 0 | Status: DISCONTINUED | OUTPATIENT
Start: 2025-07-17 | End: 2025-07-18

## 2025-07-17 RX ORDER — ESCITALOPRAM OXALATE 20 MG/1
10 TABLET ORAL DAILY
Refills: 0 | Status: DISCONTINUED | OUTPATIENT
Start: 2025-07-17 | End: 2025-07-18

## 2025-07-17 RX ADMIN — ESCITALOPRAM OXALATE 10 MILLIGRAM(S): 20 TABLET ORAL at 21:00

## 2025-07-17 RX ADMIN — APIXABAN 5 MILLIGRAM(S): 5 TABLET, FILM COATED ORAL at 20:58

## 2025-07-17 RX ADMIN — Medication 3 MILLIGRAM(S): at 21:00

## 2025-07-17 NOTE — H&P ADULT - NSHPPHYSICALEXAM_GEN_ALL_CORE
CONSTITUTIONAL: Well groomed, no apparent distress  EYES: PERRLA and symmetric, EOMI, No conjunctival or scleral injection, non-icteric  ENMT: Oral mucosa with moist membranes.              NECK: Supple, symmetric and without tracheal deviation   RESP: No respiratory distress, no use of accessory muscles; CTA b/l, no WRR  CV: RRR, +S1S2, no MRG; no JVD; no peripheral edema  GI: Soft, NT, ND  LYMPH: No cervical LAD or tenderness  MSK: Normal gait; No digital clubbing or cyanosis; examination of the (head/neck/spine/ribs/pelvis, RUE, LUE, RLE, LLE) without misalignment,            Normal ROM without pain, normal muscle strength/tone  SKIN: No rashes or ulcers noted  NEURO: CN II-XII intact; normal reflexes in upper and lower extremities  PSYCH: Appropriate insight/judgment; A+O x 3, mood and affect appropriate, recent/remote memory intact

## 2025-07-17 NOTE — PATIENT PROFILE ADULT - FUNCTIONAL ASSESSMENT - BASIC MOBILITY 6.
4-calculated by average/Not able to assess (calculate score using [facility] averaging method)

## 2025-07-17 NOTE — H&P ADULT - HISTORY OF PRESENT ILLNESS
79 year old male with history of HTN, prostate cancer (s/p prostatectomy), hep C (s/p tx), non-obs CAD, AF with RVR with HF exacerbation / tachy-mediated CM (EF 10%) s/p KATHY/DCCV in 3/2024, AF recurrence then loaded with Amio + another DCCV, s/p AF ablation 7/18/24 (Dr. Oliva), s/p Biotronik ILR implant. His ILR recorded asymptomatic short runs of PAT/PAF lasting 30 seconds-1 minute. He presents today for a redo-ablation procedure for atrial fibrillation. His last dose of Eliquis was last night 7/16/25 PM. He is compliant to AC therapy.    GDMT was started after his AF event in 3/2024 (metoprolol, entresto, spironolactone, farxiga), but he was not able to tolerate the regimen due to side effects of fatigue and dizziness.  A repeat Echo 3 months post GDMT showed improvement of LVEF to 50%. He was switched to Coreg. He is tolerating Eliquis without bleeding issues.

## 2025-07-17 NOTE — H&P ADULT - ASSESSMENT
79 year old male with history of HTN, prostate cancer (s/p prostatectomy), hep C (s/p tx), non-obs CAD, AF with RVR with HF exacerbation / tachy-mediated CM (EF 10%) s/p KATHY/DCCV in 3/2024, AF recurrence then loaded with Amio + another DCCV, s/p AF ablation 7/18/24 (Dr. Oliva), s/p Biotronik ILR implant, improved LVEF to 50% 3 months post GDMT. His ILR recorded asymptomatic short runs of PAT/PAF lasting 30 seconds-1 minute. He presents today for a redo-ablation procedure for atrial fibrillation.

## 2025-07-18 ENCOUNTER — TRANSCRIPTION ENCOUNTER (OUTPATIENT)
Age: 80
End: 2025-07-18

## 2025-07-18 VITALS
HEART RATE: 58 BPM | RESPIRATION RATE: 18 BRPM | OXYGEN SATURATION: 99 % | DIASTOLIC BLOOD PRESSURE: 68 MMHG | SYSTOLIC BLOOD PRESSURE: 121 MMHG

## 2025-07-18 PROCEDURE — 86900 BLOOD TYPING SEROLOGIC ABO: CPT

## 2025-07-18 PROCEDURE — C9399: CPT

## 2025-07-18 PROCEDURE — 86850 RBC ANTIBODY SCREEN: CPT

## 2025-07-18 PROCEDURE — 80048 BASIC METABOLIC PNL TOTAL CA: CPT

## 2025-07-18 PROCEDURE — C1760: CPT

## 2025-07-18 PROCEDURE — C1766: CPT

## 2025-07-18 PROCEDURE — C1733: CPT

## 2025-07-18 PROCEDURE — 86901 BLOOD TYPING SEROLOGIC RH(D): CPT

## 2025-07-18 PROCEDURE — 82565 ASSAY OF CREATININE: CPT

## 2025-07-18 PROCEDURE — 82330 ASSAY OF CALCIUM: CPT

## 2025-07-18 PROCEDURE — C1730: CPT

## 2025-07-18 PROCEDURE — C1769: CPT

## 2025-07-18 PROCEDURE — 85014 HEMATOCRIT: CPT

## 2025-07-18 PROCEDURE — C1893: CPT

## 2025-07-18 PROCEDURE — 82803 BLOOD GASES ANY COMBINATION: CPT

## 2025-07-18 PROCEDURE — 85347 COAGULATION TIME ACTIVATED: CPT

## 2025-07-18 PROCEDURE — C1894: CPT

## 2025-07-18 PROCEDURE — 85610 PROTHROMBIN TIME: CPT

## 2025-07-18 PROCEDURE — 84295 ASSAY OF SERUM SODIUM: CPT

## 2025-07-18 PROCEDURE — 85730 THROMBOPLASTIN TIME PARTIAL: CPT

## 2025-07-18 PROCEDURE — 85027 COMPLETE CBC AUTOMATED: CPT

## 2025-07-18 PROCEDURE — C1759: CPT

## 2025-07-18 PROCEDURE — 82947 ASSAY GLUCOSE BLOOD QUANT: CPT

## 2025-07-18 PROCEDURE — 93005 ELECTROCARDIOGRAM TRACING: CPT

## 2025-07-18 PROCEDURE — 84132 ASSAY OF SERUM POTASSIUM: CPT

## 2025-07-18 RX ORDER — CARVEDILOL 3.12 MG/1
1 TABLET, FILM COATED ORAL
Qty: 4 | Refills: 0
Start: 2025-07-18 | End: 2025-07-19

## 2025-07-18 RX ORDER — APIXABAN 5 MG/1
1 TABLET, FILM COATED ORAL
Qty: 4 | Refills: 0
Start: 2025-07-18 | End: 2025-07-19

## 2025-07-18 RX ADMIN — APIXABAN 5 MILLIGRAM(S): 5 TABLET, FILM COATED ORAL at 05:30

## 2025-07-18 RX ADMIN — CARVEDILOL 3.12 MILLIGRAM(S): 3.12 TABLET, FILM COATED ORAL at 05:30

## 2025-07-18 NOTE — DISCHARGE NOTE PROVIDER - NSDCMRMEDTOKEN_GEN_ALL_CORE_FT
apixaban 5 mg oral tablet: 1 tab(s) orally 2 times a day  carvedilol 3.125 mg oral tablet: 1 tab(s) orally every 12 hours  Lexapro 10 mg oral tablet: 1 tab(s) orally once a day   apixaban 5 mg oral tablet: 1 tab(s) orally 2 times a day  carvedilol 3.125 mg oral tablet: 1 tab(s) orally every 12 hours  Coreg 3.125 mg oral tablet: 1 tab(s) orally 2 times a day  Eliquis 5 mg oral tablet: 1 tab(s) orally 2 times a day  Lexapro 10 mg oral tablet: 1 tab(s) orally once a day

## 2025-07-18 NOTE — DISCHARGE NOTE NURSING/CASE MANAGEMENT/SOCIAL WORK - FINANCIAL ASSISTANCE
[facility] provides services at a reduced cost to those who are determined to be eligible through [facility]’s financial assistance program. Information regarding [facility]’s financial assistance program can be found by going to [email or web address] or by calling [phone].

## 2025-07-18 NOTE — DISCHARGE NOTE NURSING/CASE MANAGEMENT/SOCIAL WORK - PATIENT PORTAL LINK FT
You can access the FollowMyHealth Patient Portal offered by [facility] by registering at the following website: [email or web address]. By joining [employer]’s FollowMyHealth portal, you will also be able to view your health information using other applications (apps) compatible with our system.

## 2025-07-18 NOTE — DISCHARGE NOTE PROVIDER - HOSPITAL COURSE
79 year old male with history of HTN, prostate cancer (s/p prostatectomy), hep C (s/p tx), non-obs CAD, AF with RVR with HF exacerbation / tachy-mediated CM (EF 10%) s/p KATHY/DCCV in 3/2024, AF recurrence then loaded with Amio + another DCCV, s/p AF ablation 7/18/24 (Dr. Oliva), s/p Biotronik ILR implant. His ILR recorded asymptomatic short runs of PAT/PAF lasting 30 seconds-1 minute. He presents today for a redo-ablation procedure for atrial fibrillation. His last dose of Eliquis was last night 7/16/25 PM. He is compliant to AC therapy.    GDMT was started after his AF event in 3/2024 (metoprolol, entresto, spironolactone, farxiga), but he was not able to tolerate the regimen due to side effects of fatigue and dizziness.  A repeat Echo 3 months post GDMT showed improvement of LVEF to 50%. He was switched to Coreg. He is tolerating Eliquis without bleeding issues.    Patient underwent AF ablation on 7/18/25  Over night, patient was well without complaints. Patient denies chest pain, chest pressure, shortness of breath with ambulation or at rest. He is able to ambulate without groin discomfort. On exam, bilateral groin sites are clean, dry, with slight ecchymosis without palpable hematoma. Telemetry reviewed. Telemetry shows SR at 54-60bpm.     Patient is stable for discharge. He will continue her medication. Anticoagulated on eliquis.   Patient will follow up with Dr. Oliva in 2-4 weeks.   Patient is instructed on his groin wound care.  Avoid strenuous activities such as lifting, running, pushing or sexual activities for 1 week in order to avoid bleeding complications in the groin. If any questions regarding the groin, please call us at [phone]. It is okay to shower tonight. Please avoid bathing or swimming for 1 week.

## 2025-07-18 NOTE — DISCHARGE NOTE NURSING/CASE MANAGEMENT/SOCIAL WORK - NSDCPEFALRISK_GEN_ALL_CORE
For information on Fall & Injury Prevention, visit: [email or web address] OR  [email or web address] OR  https://www.cdc.gov/steadi/patient.html

## 2025-07-18 NOTE — DISCHARGE NOTE PROVIDER - CARE PROVIDER_API CALL
Julián Oliva)  Clinical Cardiac Electrophysiology  [address] [address]  Phone: [phone]  Fax: [phone]  Follow Up Time: 2 weeks

## 2025-07-18 NOTE — DISCHARGE NOTE PROVIDER - CARE PROVIDERS DIRECT ADDRESSES
[email or web address]
Oriented - self; Oriented - place; Oriented - time

## 2025-07-24 DIAGNOSIS — Z85.46 PERSONAL HISTORY OF MALIGNANT NEOPLASM OF PROSTATE: ICD-10-CM

## 2025-07-24 DIAGNOSIS — I48.91 UNSPECIFIED ATRIAL FIBRILLATION: ICD-10-CM

## 2025-07-24 DIAGNOSIS — I42.9 CARDIOMYOPATHY, UNSPECIFIED: ICD-10-CM

## 2025-07-24 DIAGNOSIS — I10 ESSENTIAL (PRIMARY) HYPERTENSION: ICD-10-CM

## 2025-07-24 DIAGNOSIS — I25.10 ATHEROSCLEROTIC HEART DISEASE OF NATIVE CORONARY ARTERY WITHOUT ANGINA PECTORIS: ICD-10-CM

## 2025-07-24 DIAGNOSIS — Z86.19 PERSONAL HISTORY OF OTHER INFECTIOUS AND PARASITIC DISEASES: ICD-10-CM
